# Patient Record
Sex: FEMALE | Race: OTHER | NOT HISPANIC OR LATINO | URBAN - METROPOLITAN AREA
[De-identification: names, ages, dates, MRNs, and addresses within clinical notes are randomized per-mention and may not be internally consistent; named-entity substitution may affect disease eponyms.]

---

## 2022-08-25 ENCOUNTER — EMERGENCY (EMERGENCY)
Facility: HOSPITAL | Age: 62
LOS: 1 days | Discharge: HOME | End: 2022-08-25
Admitting: EMERGENCY MEDICINE

## 2022-08-25 VITALS
RESPIRATION RATE: 18 BRPM | SYSTOLIC BLOOD PRESSURE: 119 MMHG | TEMPERATURE: 98 F | DIASTOLIC BLOOD PRESSURE: 71 MMHG | OXYGEN SATURATION: 97 % | HEART RATE: 67 BPM

## 2022-08-25 PROCEDURE — 99285 EMERGENCY DEPT VISIT HI MDM: CPT

## 2022-08-25 NOTE — ED ADULT TRIAGE NOTE - CHIEF COMPLAINT QUOTE
c/o left sided chest pain x 1 hour. states is intermittent. not associated with any other symptoms. denies any medical hx.

## 2022-08-26 VITALS
HEART RATE: 66 BPM | RESPIRATION RATE: 17 BRPM | TEMPERATURE: 98 F | OXYGEN SATURATION: 100 % | SYSTOLIC BLOOD PRESSURE: 119 MMHG | DIASTOLIC BLOOD PRESSURE: 74 MMHG

## 2022-08-26 LAB
ALBUMIN SERPL ELPH-MCNC: 3.7 G/DL — SIGNIFICANT CHANGE UP (ref 3.3–5)
ALP SERPL-CCNC: 90 U/L — SIGNIFICANT CHANGE UP (ref 40–120)
ALT FLD-CCNC: 20 U/L — SIGNIFICANT CHANGE UP (ref 4–33)
ANION GAP SERPL CALC-SCNC: 8 MMOL/L — SIGNIFICANT CHANGE UP (ref 7–14)
AST SERPL-CCNC: 18 U/L — SIGNIFICANT CHANGE UP (ref 4–32)
BASOPHILS # BLD AUTO: 0.05 K/UL — SIGNIFICANT CHANGE UP (ref 0–0.2)
BASOPHILS NFR BLD AUTO: 0.6 % — SIGNIFICANT CHANGE UP (ref 0–2)
BILIRUB SERPL-MCNC: <0.2 MG/DL — SIGNIFICANT CHANGE UP (ref 0.2–1.2)
BUN SERPL-MCNC: 15 MG/DL — SIGNIFICANT CHANGE UP (ref 7–23)
CALCIUM SERPL-MCNC: 8.8 MG/DL — SIGNIFICANT CHANGE UP (ref 8.4–10.5)
CHLORIDE SERPL-SCNC: 105 MMOL/L — SIGNIFICANT CHANGE UP (ref 98–107)
CO2 SERPL-SCNC: 23 MMOL/L — SIGNIFICANT CHANGE UP (ref 22–31)
CREAT SERPL-MCNC: 1.18 MG/DL — SIGNIFICANT CHANGE UP (ref 0.5–1.3)
EGFR: 53 ML/MIN/1.73M2 — LOW
EOSINOPHIL # BLD AUTO: 0.15 K/UL — SIGNIFICANT CHANGE UP (ref 0–0.5)
EOSINOPHIL NFR BLD AUTO: 1.7 % — SIGNIFICANT CHANGE UP (ref 0–6)
GLUCOSE SERPL-MCNC: 96 MG/DL — SIGNIFICANT CHANGE UP (ref 70–99)
HCT VFR BLD CALC: 38 % — SIGNIFICANT CHANGE UP (ref 34.5–45)
HGB BLD-MCNC: 11.8 G/DL — SIGNIFICANT CHANGE UP (ref 11.5–15.5)
IANC: 4.92 K/UL — SIGNIFICANT CHANGE UP (ref 1.8–7.4)
IMM GRANULOCYTES NFR BLD AUTO: 0.2 % — SIGNIFICANT CHANGE UP (ref 0–1.5)
LYMPHOCYTES # BLD AUTO: 2.59 K/UL — SIGNIFICANT CHANGE UP (ref 1–3.3)
LYMPHOCYTES # BLD AUTO: 29.8 % — SIGNIFICANT CHANGE UP (ref 13–44)
MCHC RBC-ENTMCNC: 25.7 PG — LOW (ref 27–34)
MCHC RBC-ENTMCNC: 31.1 GM/DL — LOW (ref 32–36)
MCV RBC AUTO: 82.8 FL — SIGNIFICANT CHANGE UP (ref 80–100)
MONOCYTES # BLD AUTO: 0.96 K/UL — HIGH (ref 0–0.9)
MONOCYTES NFR BLD AUTO: 11 % — SIGNIFICANT CHANGE UP (ref 2–14)
NEUTROPHILS # BLD AUTO: 4.92 K/UL — SIGNIFICANT CHANGE UP (ref 1.8–7.4)
NEUTROPHILS NFR BLD AUTO: 56.7 % — SIGNIFICANT CHANGE UP (ref 43–77)
NRBC # BLD: 0 /100 WBCS — SIGNIFICANT CHANGE UP (ref 0–0)
NRBC # FLD: 0 K/UL — SIGNIFICANT CHANGE UP (ref 0–0)
PLATELET # BLD AUTO: 239 K/UL — SIGNIFICANT CHANGE UP (ref 150–400)
POTASSIUM SERPL-MCNC: 3.9 MMOL/L — SIGNIFICANT CHANGE UP (ref 3.5–5.3)
POTASSIUM SERPL-SCNC: 3.9 MMOL/L — SIGNIFICANT CHANGE UP (ref 3.5–5.3)
PROT SERPL-MCNC: 7.3 G/DL — SIGNIFICANT CHANGE UP (ref 6–8.3)
RBC # BLD: 4.59 M/UL — SIGNIFICANT CHANGE UP (ref 3.8–5.2)
RBC # FLD: 13.7 % — SIGNIFICANT CHANGE UP (ref 10.3–14.5)
SODIUM SERPL-SCNC: 136 MMOL/L — SIGNIFICANT CHANGE UP (ref 135–145)
TROPONIN T, HIGH SENSITIVITY RESULT: <6 NG/L — SIGNIFICANT CHANGE UP
TROPONIN T, HIGH SENSITIVITY RESULT: <6 NG/L — SIGNIFICANT CHANGE UP
WBC # BLD: 8.69 K/UL — SIGNIFICANT CHANGE UP (ref 3.8–10.5)
WBC # FLD AUTO: 8.69 K/UL — SIGNIFICANT CHANGE UP (ref 3.8–10.5)

## 2022-08-26 PROCEDURE — 71046 X-RAY EXAM CHEST 2 VIEWS: CPT | Mod: 26

## 2022-08-26 RX ORDER — FAMOTIDINE 10 MG/ML
20 INJECTION INTRAVENOUS ONCE
Refills: 0 | Status: COMPLETED | OUTPATIENT
Start: 2022-08-26 | End: 2022-08-26

## 2022-08-26 RX ADMIN — FAMOTIDINE 20 MILLIGRAM(S): 10 INJECTION INTRAVENOUS at 00:46

## 2022-08-26 RX ADMIN — Medication 30 MILLILITER(S): at 00:45

## 2022-08-26 NOTE — ED PROVIDER NOTE - NSFOLLOWUPINSTRUCTIONS_ED_ALL_ED_FT
Follow up with your primary doctor and Cardiologist. Bring this packet which includes copies of your results.  Advance activity as tolerated.  Continue all previously prescribed medications as directed.  Follow up with your primary care physician in 48-72 hours- bring copies of your results.  Return to the ER for worsening or persistent symptoms, and/or ANY NEW OR CONCERNING SYMPTOMS. THIS INCLUDES BUT IS NOT LIMITED TO INCREASING OR PERSISTENT CHEST PAIN, SHORTNESS OF BREATH OR FOR ANY OTHER SYMPTOMS THAT CONCERN YOU. If you have issues obtaining follow up, please call: [phone] to obtain a doctor or specialist who takes your insurance in your area.  You may call [phone] to make an appointment with the internal medicine clinic.

## 2022-08-26 NOTE — ED PROVIDER NOTE - PROGRESS NOTE DETAILS
TULIO Aviles: Received signout on pt, states she would like to go home, Trop is <6 x2, offered the patient CDU placement for a stress test, continued telemetry monitoring and further eval. Pt declines, states she would like to go home now, states she already has a Cardiologist who she can follow up with.

## 2022-08-26 NOTE — ED PROVIDER NOTE - OBJECTIVE STATEMENT
60 y/o F h/o HLD, GERD p/w L sided chest pain x tonight starting at 8 pm. Pt reports while at rest started to develop sharp chest pain. Pain radiates into neck. Pt unsure if pain is similar to GERD symptoms. Tried taking Pepto bismol with no relief. Pt denies any sob. No exertional symptoms. No fever, chills, cough, abd pain, n/v/d, headache, dizziness.  Pt reports she had a stress test about a year ago that she reports was unremarkable.

## 2022-08-26 NOTE — ED PROVIDER NOTE - CLINICAL SUMMARY MEDICAL DECISION MAKING FREE TEXT BOX
60 y/o F h/o HLD, GERD p/w L sided chest pain x tonight starting at 8 pm.  plan  - labs  - trop  - ekg  - cxr  - GI cocktail  - cdu vs prompt cardiology follow up if neg w/u in ED.

## 2022-08-26 NOTE — ED ADULT NURSE NOTE - NS ED NURSE LEVEL OF CONSCIOUSNESS SPEECH
SUBJECTIVE:  Joann Helton [facility] is a 23 year old female with a chief complaint of sore throat , nasal congestion and ears hurt some.  Felt nauseated with HA yesterday but admits that had a hangover and much better today.  Taking in fluids well.  No fevers that aware of.  Cough normal and no SOB or chest pain.  Onset of symptoms was 2 day(s) ago.    Hx of ear issue and strep when younger  Course of illness: gradual onset and still present.  Severity moderate  Current and Associated symptoms: stuffy nose and sore throat  Treatment measures tried include OTC med and fluids.  Predisposing factors include as above.      No PMH for sinus issues    Still with tonsils.    No past medical history on file.  No current outpatient prescriptions on file.     Social History   Substance Use Topics     Smoking status: Never Smoker     Smokeless tobacco: Never Used     Alcohol use Not on file       ROS:  Review of systems negative except as stated above.    OBJECTIVE:   /80 (BP Location: Right arm, Patient Position: Chair, Cuff Size: Adult Large)  Pulse 86  Temp 99.6  F (37.6  C) (Tympanic)  Wt 195 lb 12.8 oz (88.8 kg)  SpO2 97%  GENERAL APPEARANCE: healthy, alert and no distress  EYES: EOMI,  PERRL, conjunctiva clear  HENT: Left TM erythematous and bulging.  Right TM clear. Oral mucosa moist without erythema or exudate and uvula midline.  No abscess noted  NECK: supple, non-tender to palpation, bilateral cervical adenopathy  RESP: lungs clear to auscultation - no rales, rhonchi or wheezes  CV: regular rates and rhythm, normal S1 S2, no murmur noted  SKIN: no suspicious lesions or rashes    Rapid Strep test is positive    ASSESSMENT:  Strep  OM  - left     PLAN:   Amoxicillin as directed and change toothbrush in 48 hours.     Symptomatic treat with gargles, lozenges, and OTC analgesic as needed. Follow-up with primary clinic if not improving.  Advisement given that patient will be contagious for the next 24-48 hours after  antibiotics initiated     Speaking Coherently

## 2022-08-26 NOTE — ED PROVIDER NOTE - PATIENT PORTAL LINK FT
You can access the FollowMyHealth Patient Portal offered by [facility] by registering at the following website: [email or web address]. By joining [employer]’s FollowMyHealth portal, you will also be able to view your health information using other applications (apps) compatible with our system.

## 2022-08-26 NOTE — ED ADULT NURSE NOTE - OBJECTIVE STATEMENT
received pt to intake 2 aox4 in no apparent distress VSS c/o chest pain intermittent x1 hr. Pt denies SOB, weakness, or any other associated symptoms. 20 G PIV R AC placed labs sent, medicated as per orders. Breaths equal and unlabored will continue to monitor

## 2022-11-23 PROBLEM — K21.9 GASTRO-ESOPHAGEAL REFLUX DISEASE WITHOUT ESOPHAGITIS: Chronic | Status: ACTIVE | Noted: 2022-08-26

## 2022-12-08 PROBLEM — Z00.00 ENCOUNTER FOR PREVENTIVE HEALTH EXAMINATION: Status: ACTIVE | Noted: 2022-12-08

## 2023-01-05 ENCOUNTER — APPOINTMENT (OUTPATIENT)
Dept: ELECTROPHYSIOLOGY | Facility: CLINIC | Age: 63
End: 2023-01-05

## 2023-12-12 ENCOUNTER — EMERGENCY (EMERGENCY)
Facility: HOSPITAL | Age: 63
LOS: 1 days | Discharge: HOME | End: 2023-12-12
Attending: STUDENT IN AN ORGANIZED HEALTH CARE EDUCATION/TRAINING PROGRAM | Admitting: STUDENT IN AN ORGANIZED HEALTH CARE EDUCATION/TRAINING PROGRAM
Payer: MEDICAID

## 2023-12-12 VITALS
DIASTOLIC BLOOD PRESSURE: 69 MMHG | SYSTOLIC BLOOD PRESSURE: 136 MMHG | TEMPERATURE: 98 F | RESPIRATION RATE: 20 BRPM | HEART RATE: 84 BPM | OXYGEN SATURATION: 97 %

## 2023-12-12 LAB
ALBUMIN SERPL ELPH-MCNC: 3.6 G/DL — SIGNIFICANT CHANGE UP (ref 3.3–5)
ALBUMIN SERPL ELPH-MCNC: 3.6 G/DL — SIGNIFICANT CHANGE UP (ref 3.3–5)
ALP SERPL-CCNC: 77 U/L — SIGNIFICANT CHANGE UP (ref 40–120)
ALP SERPL-CCNC: 77 U/L — SIGNIFICANT CHANGE UP (ref 40–120)
ALT FLD-CCNC: 18 U/L — SIGNIFICANT CHANGE UP (ref 4–33)
ALT FLD-CCNC: 18 U/L — SIGNIFICANT CHANGE UP (ref 4–33)
ANION GAP SERPL CALC-SCNC: 9 MMOL/L — SIGNIFICANT CHANGE UP (ref 7–14)
ANION GAP SERPL CALC-SCNC: 9 MMOL/L — SIGNIFICANT CHANGE UP (ref 7–14)
AST SERPL-CCNC: 54 U/L — HIGH (ref 4–32)
AST SERPL-CCNC: 54 U/L — HIGH (ref 4–32)
BASOPHILS # BLD AUTO: 0.03 K/UL — SIGNIFICANT CHANGE UP (ref 0–0.2)
BASOPHILS # BLD AUTO: 0.03 K/UL — SIGNIFICANT CHANGE UP (ref 0–0.2)
BASOPHILS NFR BLD AUTO: 0.4 % — SIGNIFICANT CHANGE UP (ref 0–2)
BASOPHILS NFR BLD AUTO: 0.4 % — SIGNIFICANT CHANGE UP (ref 0–2)
BILIRUB SERPL-MCNC: 0.3 MG/DL — SIGNIFICANT CHANGE UP (ref 0.2–1.2)
BILIRUB SERPL-MCNC: 0.3 MG/DL — SIGNIFICANT CHANGE UP (ref 0.2–1.2)
BUN SERPL-MCNC: 15 MG/DL — SIGNIFICANT CHANGE UP (ref 7–23)
BUN SERPL-MCNC: 15 MG/DL — SIGNIFICANT CHANGE UP (ref 7–23)
CALCIUM SERPL-MCNC: 8.8 MG/DL — SIGNIFICANT CHANGE UP (ref 8.4–10.5)
CALCIUM SERPL-MCNC: 8.8 MG/DL — SIGNIFICANT CHANGE UP (ref 8.4–10.5)
CHLORIDE SERPL-SCNC: 106 MMOL/L — SIGNIFICANT CHANGE UP (ref 98–107)
CHLORIDE SERPL-SCNC: 106 MMOL/L — SIGNIFICANT CHANGE UP (ref 98–107)
CO2 SERPL-SCNC: 20 MMOL/L — LOW (ref 22–31)
CO2 SERPL-SCNC: 20 MMOL/L — LOW (ref 22–31)
CREAT SERPL-MCNC: 0.94 MG/DL — SIGNIFICANT CHANGE UP (ref 0.5–1.3)
CREAT SERPL-MCNC: 0.94 MG/DL — SIGNIFICANT CHANGE UP (ref 0.5–1.3)
D DIMER BLD IA.RAPID-MCNC: <150 NG/ML DDU — SIGNIFICANT CHANGE UP
D DIMER BLD IA.RAPID-MCNC: <150 NG/ML DDU — SIGNIFICANT CHANGE UP
EGFR: 69 ML/MIN/1.73M2 — SIGNIFICANT CHANGE UP
EGFR: 69 ML/MIN/1.73M2 — SIGNIFICANT CHANGE UP
EOSINOPHIL # BLD AUTO: 0.28 K/UL — SIGNIFICANT CHANGE UP (ref 0–0.5)
EOSINOPHIL # BLD AUTO: 0.28 K/UL — SIGNIFICANT CHANGE UP (ref 0–0.5)
EOSINOPHIL NFR BLD AUTO: 3.5 % — SIGNIFICANT CHANGE UP (ref 0–6)
EOSINOPHIL NFR BLD AUTO: 3.5 % — SIGNIFICANT CHANGE UP (ref 0–6)
FLUAV AG NPH QL: SIGNIFICANT CHANGE UP
FLUAV AG NPH QL: SIGNIFICANT CHANGE UP
FLUBV AG NPH QL: SIGNIFICANT CHANGE UP
FLUBV AG NPH QL: SIGNIFICANT CHANGE UP
GLUCOSE SERPL-MCNC: 90 MG/DL — SIGNIFICANT CHANGE UP (ref 70–99)
GLUCOSE SERPL-MCNC: 90 MG/DL — SIGNIFICANT CHANGE UP (ref 70–99)
HCT VFR BLD CALC: 40.6 % — SIGNIFICANT CHANGE UP (ref 34.5–45)
HCT VFR BLD CALC: 40.6 % — SIGNIFICANT CHANGE UP (ref 34.5–45)
HGB BLD-MCNC: 12.7 G/DL — SIGNIFICANT CHANGE UP (ref 11.5–15.5)
HGB BLD-MCNC: 12.7 G/DL — SIGNIFICANT CHANGE UP (ref 11.5–15.5)
IANC: 4.71 K/UL — SIGNIFICANT CHANGE UP (ref 1.8–7.4)
IANC: 4.71 K/UL — SIGNIFICANT CHANGE UP (ref 1.8–7.4)
IMM GRANULOCYTES NFR BLD AUTO: 0.1 % — SIGNIFICANT CHANGE UP (ref 0–0.9)
IMM GRANULOCYTES NFR BLD AUTO: 0.1 % — SIGNIFICANT CHANGE UP (ref 0–0.9)
LYMPHOCYTES # BLD AUTO: 2.37 K/UL — SIGNIFICANT CHANGE UP (ref 1–3.3)
LYMPHOCYTES # BLD AUTO: 2.37 K/UL — SIGNIFICANT CHANGE UP (ref 1–3.3)
LYMPHOCYTES # BLD AUTO: 29.7 % — SIGNIFICANT CHANGE UP (ref 13–44)
LYMPHOCYTES # BLD AUTO: 29.7 % — SIGNIFICANT CHANGE UP (ref 13–44)
MCHC RBC-ENTMCNC: 26.2 PG — LOW (ref 27–34)
MCHC RBC-ENTMCNC: 26.2 PG — LOW (ref 27–34)
MCHC RBC-ENTMCNC: 31.3 GM/DL — LOW (ref 32–36)
MCHC RBC-ENTMCNC: 31.3 GM/DL — LOW (ref 32–36)
MCV RBC AUTO: 83.7 FL — SIGNIFICANT CHANGE UP (ref 80–100)
MCV RBC AUTO: 83.7 FL — SIGNIFICANT CHANGE UP (ref 80–100)
MONOCYTES # BLD AUTO: 0.59 K/UL — SIGNIFICANT CHANGE UP (ref 0–0.9)
MONOCYTES # BLD AUTO: 0.59 K/UL — SIGNIFICANT CHANGE UP (ref 0–0.9)
MONOCYTES NFR BLD AUTO: 7.4 % — SIGNIFICANT CHANGE UP (ref 2–14)
MONOCYTES NFR BLD AUTO: 7.4 % — SIGNIFICANT CHANGE UP (ref 2–14)
NEUTROPHILS # BLD AUTO: 4.71 K/UL — SIGNIFICANT CHANGE UP (ref 1.8–7.4)
NEUTROPHILS # BLD AUTO: 4.71 K/UL — SIGNIFICANT CHANGE UP (ref 1.8–7.4)
NEUTROPHILS NFR BLD AUTO: 58.9 % — SIGNIFICANT CHANGE UP (ref 43–77)
NEUTROPHILS NFR BLD AUTO: 58.9 % — SIGNIFICANT CHANGE UP (ref 43–77)
NRBC # BLD: 0 /100 WBCS — SIGNIFICANT CHANGE UP (ref 0–0)
NRBC # BLD: 0 /100 WBCS — SIGNIFICANT CHANGE UP (ref 0–0)
NRBC # FLD: 0.03 K/UL — HIGH (ref 0–0)
NRBC # FLD: 0.03 K/UL — HIGH (ref 0–0)
NT-PROBNP SERPL-SCNC: 54 PG/ML — SIGNIFICANT CHANGE UP
NT-PROBNP SERPL-SCNC: 54 PG/ML — SIGNIFICANT CHANGE UP
PLATELET # BLD AUTO: 250 K/UL — SIGNIFICANT CHANGE UP (ref 150–400)
PLATELET # BLD AUTO: 250 K/UL — SIGNIFICANT CHANGE UP (ref 150–400)
POTASSIUM SERPL-MCNC: SIGNIFICANT CHANGE UP MMOL/L (ref 3.5–5.3)
POTASSIUM SERPL-MCNC: SIGNIFICANT CHANGE UP MMOL/L (ref 3.5–5.3)
POTASSIUM SERPL-SCNC: SIGNIFICANT CHANGE UP MMOL/L (ref 3.5–5.3)
POTASSIUM SERPL-SCNC: SIGNIFICANT CHANGE UP MMOL/L (ref 3.5–5.3)
PROT SERPL-MCNC: SIGNIFICANT CHANGE UP G/DL (ref 6–8.3)
PROT SERPL-MCNC: SIGNIFICANT CHANGE UP G/DL (ref 6–8.3)
RBC # BLD: 4.85 M/UL — SIGNIFICANT CHANGE UP (ref 3.8–5.2)
RBC # BLD: 4.85 M/UL — SIGNIFICANT CHANGE UP (ref 3.8–5.2)
RBC # FLD: 13.8 % — SIGNIFICANT CHANGE UP (ref 10.3–14.5)
RBC # FLD: 13.8 % — SIGNIFICANT CHANGE UP (ref 10.3–14.5)
RSV RNA NPH QL NAA+NON-PROBE: SIGNIFICANT CHANGE UP
RSV RNA NPH QL NAA+NON-PROBE: SIGNIFICANT CHANGE UP
SARS-COV-2 RNA SPEC QL NAA+PROBE: SIGNIFICANT CHANGE UP
SARS-COV-2 RNA SPEC QL NAA+PROBE: SIGNIFICANT CHANGE UP
SODIUM SERPL-SCNC: 135 MMOL/L — SIGNIFICANT CHANGE UP (ref 135–145)
SODIUM SERPL-SCNC: 135 MMOL/L — SIGNIFICANT CHANGE UP (ref 135–145)
TROPONIN T, HIGH SENSITIVITY RESULT: <6 NG/L — SIGNIFICANT CHANGE UP
TROPONIN T, HIGH SENSITIVITY RESULT: <6 NG/L — SIGNIFICANT CHANGE UP
WBC # BLD: 7.99 K/UL — SIGNIFICANT CHANGE UP (ref 3.8–10.5)
WBC # BLD: 7.99 K/UL — SIGNIFICANT CHANGE UP (ref 3.8–10.5)
WBC # FLD AUTO: 7.99 K/UL — SIGNIFICANT CHANGE UP (ref 3.8–10.5)
WBC # FLD AUTO: 7.99 K/UL — SIGNIFICANT CHANGE UP (ref 3.8–10.5)

## 2023-12-12 PROCEDURE — 99285 EMERGENCY DEPT VISIT HI MDM: CPT

## 2023-12-12 PROCEDURE — 71045 X-RAY EXAM CHEST 1 VIEW: CPT | Mod: 26

## 2023-12-12 NOTE — ED PROVIDER NOTE - OBJECTIVE STATEMENT
63-year-old female presents with 1 week of cough and chest pain.  Patient states 1 week ago she developed fevers and nonproductive cough.  Since onset fevers have resolved, however she has had a persistent dry cough.  States she has been experiencing bouts of chest pain with radiation to bilateral shoulders, left neck, no associated nausea vomiting or diaphoresis.  Additionally she complains of 2 weeks of dyspnea and generalized fatigue, slightly worse on exertion, however, present at rest, denies history of asthma or COPD.  Denies recent travel, family history of blood clotting disorders.  Patient was evaluated by cardiologist about a month ago, received echo, per patient no acute findings.  Denies abdominal pain, dysuria.

## 2023-12-12 NOTE — ED PROVIDER NOTE - ATTENDING CONTRIBUTION TO CARE
I (Rose) agree with above, I performed a history and physical. Counseled april medical staff, physician assistant, and/or medical student on medical decision making as documented. Medical decisions and treatment interventions were made in real time during the patient encounter. Additionally and/or with the following exceptions: 63-year-old female no stated past medical history is presenting to the emergency department with chest pain.  Has had 2 weeks of cough and chest pain.  She has exertional dyspnea that had started even prior to that.  Patient is well-appearing lungs clear to auscultation bilaterally vital signs within normal limits.  Patient is neurologically intact.  Patient was signed out to oncoming attending pending CBC, D-dimer to rule out PE as patient was not PERC negative and CMP and troponin.  EKG nonischemic low voltages.  If workup negative patient would have a heart score less than 4 and would be amenable to outpatient follow-up.

## 2023-12-12 NOTE — ED PROVIDER NOTE - PATIENT PORTAL LINK FT
You can access the FollowMyHealth Patient Portal offered by [facility] by registering at the following website: [email or web address]. By joining [employer] [facility]’s FollowMyHealth portal, you will also be able to view your health information using other applications (apps) compatible with our system. You can access the FollowMyHealth Patient Portal offered by [facility] by registering at the following website: [email or web address]. By joining [employer]’s FollowMyHealth portal, you will also be able to view your health information using other applications (apps) compatible with our system.

## 2023-12-12 NOTE — ED PROVIDER NOTE - CLINICAL SUMMARY MEDICAL DECISION MAKING FREE TEXT BOX
63-year-old female presents with 1 week of cough and chest pain.  Patient states 1 week ago she developed fevers and nonproductive cough.  Since onset fevers have resolved, however she has had a persistent dry cough.  States she has been experiencing bouts of chest pain with radiation to bilateral shoulders, left neck, no associated nausea vomiting or diaphoresis.  Additionally she complains of 2 weeks of dyspnea and generalized fatigue, slightly worse on exertion, however, present at rest, denies history of asthma or COPD.  Denies recent travel, family history of blood clotting disorders.  Patient was evaluated by cardiologist about a month ago, received echo, per patient no acute findings.  Denies abdominal pain, dysuria. Vitals WNL on arrival. Differential includes viral URI, chostochondritis, ACS, pericaditis, myocarditis, PNA, less likely PE, WELLS score of 0.

## 2024-11-30 ENCOUNTER — INPATIENT (INPATIENT)
Facility: HOSPITAL | Age: 64
LOS: 6 days | Discharge: HOME | End: 2024-12-07
Attending: INTERNAL MEDICINE | Admitting: INTERNAL MEDICINE
Payer: MEDICAID

## 2024-11-30 VITALS
TEMPERATURE: 98 F | DIASTOLIC BLOOD PRESSURE: 78 MMHG | HEART RATE: 74 BPM | SYSTOLIC BLOOD PRESSURE: 114 MMHG | OXYGEN SATURATION: 97 % | RESPIRATION RATE: 18 BRPM | WEIGHT: 149.91 LBS

## 2024-11-30 DIAGNOSIS — R42 DIZZINESS AND GIDDINESS: ICD-10-CM

## 2024-11-30 DIAGNOSIS — Z98.891 HISTORY OF UTERINE SCAR FROM PREVIOUS SURGERY: Chronic | ICD-10-CM

## 2024-11-30 LAB
ALBUMIN SERPL ELPH-MCNC: 3.4 G/DL — SIGNIFICANT CHANGE UP (ref 3.3–5)
ALP SERPL-CCNC: 119 U/L — SIGNIFICANT CHANGE UP (ref 40–120)
ALT FLD-CCNC: 33 U/L — SIGNIFICANT CHANGE UP (ref 4–33)
ANION GAP SERPL CALC-SCNC: 12 MMOL/L — SIGNIFICANT CHANGE UP (ref 7–14)
APPEARANCE UR: ABNORMAL
AST SERPL-CCNC: 21 U/L — SIGNIFICANT CHANGE UP (ref 4–32)
BASOPHILS # BLD AUTO: 0.07 K/UL — SIGNIFICANT CHANGE UP (ref 0–0.2)
BASOPHILS NFR BLD AUTO: 0.8 % — SIGNIFICANT CHANGE UP (ref 0–2)
BILIRUB SERPL-MCNC: 0.3 MG/DL — SIGNIFICANT CHANGE UP (ref 0.2–1.2)
BILIRUB UR-MCNC: NEGATIVE — SIGNIFICANT CHANGE UP
BUN SERPL-MCNC: 17 MG/DL — SIGNIFICANT CHANGE UP (ref 7–23)
CALCIUM SERPL-MCNC: 8.8 MG/DL — SIGNIFICANT CHANGE UP (ref 8.4–10.5)
CHLORIDE SERPL-SCNC: 108 MMOL/L — HIGH (ref 98–107)
CO2 SERPL-SCNC: 17 MMOL/L — LOW (ref 22–31)
COLOR SPEC: YELLOW — SIGNIFICANT CHANGE UP
CREAT SERPL-MCNC: 0.86 MG/DL — SIGNIFICANT CHANGE UP (ref 0.5–1.3)
D DIMER BLD IA.RAPID-MCNC: 155 NG/ML DDU — SIGNIFICANT CHANGE UP
DIFF PNL FLD: NEGATIVE — SIGNIFICANT CHANGE UP
EGFR: 77 ML/MIN/1.73M2 — SIGNIFICANT CHANGE UP
EGFR: 77 ML/MIN/1.73M2 — SIGNIFICANT CHANGE UP
EOSINOPHIL # BLD AUTO: 0.12 K/UL — SIGNIFICANT CHANGE UP (ref 0–0.5)
EOSINOPHIL NFR BLD AUTO: 1.3 % — SIGNIFICANT CHANGE UP (ref 0–6)
GLUCOSE SERPL-MCNC: 87 MG/DL — SIGNIFICANT CHANGE UP (ref 70–99)
GLUCOSE UR QL: NEGATIVE MG/DL — SIGNIFICANT CHANGE UP
HCT VFR BLD CALC: 44.9 % — SIGNIFICANT CHANGE UP (ref 34.5–45)
HGB BLD-MCNC: 13.7 G/DL — SIGNIFICANT CHANGE UP (ref 11.5–15.5)
IANC: 5.2 K/UL — SIGNIFICANT CHANGE UP (ref 1.8–7.4)
IMM GRANULOCYTES NFR BLD AUTO: 0.2 % — SIGNIFICANT CHANGE UP (ref 0–0.9)
KETONES UR-MCNC: NEGATIVE MG/DL — SIGNIFICANT CHANGE UP
LEUKOCYTE ESTERASE UR-ACNC: ABNORMAL
LIDOCAIN IGE QN: 60 U/L — SIGNIFICANT CHANGE UP (ref 7–60)
LYMPHOCYTES # BLD AUTO: 2.86 K/UL — SIGNIFICANT CHANGE UP (ref 1–3.3)
LYMPHOCYTES # BLD AUTO: 32 % — SIGNIFICANT CHANGE UP (ref 13–44)
MCHC RBC-ENTMCNC: 26.2 PG — LOW (ref 27–34)
MCHC RBC-ENTMCNC: 30.5 G/DL — LOW (ref 32–36)
MCV RBC AUTO: 86 FL — SIGNIFICANT CHANGE UP (ref 80–100)
MONOCYTES # BLD AUTO: 0.67 K/UL — SIGNIFICANT CHANGE UP (ref 0–0.9)
MONOCYTES NFR BLD AUTO: 7.5 % — SIGNIFICANT CHANGE UP (ref 2–14)
NEUTROPHILS # BLD AUTO: 5.2 K/UL — SIGNIFICANT CHANGE UP (ref 1.8–7.4)
NEUTROPHILS NFR BLD AUTO: 58.2 % — SIGNIFICANT CHANGE UP (ref 43–77)
NITRITE UR-MCNC: NEGATIVE — SIGNIFICANT CHANGE UP
NRBC # BLD AUTO: 0 K/UL — SIGNIFICANT CHANGE UP (ref 0–0)
NRBC # BLD: 0 /100 WBCS — SIGNIFICANT CHANGE UP (ref 0–0)
NRBC # FLD: 0 K/UL — SIGNIFICANT CHANGE UP (ref 0–0)
NRBC BLD-RTO: 0 /100 WBCS — SIGNIFICANT CHANGE UP (ref 0–0)
PH UR: 5.5 — SIGNIFICANT CHANGE UP (ref 5–8)
PLATELET # BLD AUTO: 285 K/UL — SIGNIFICANT CHANGE UP (ref 150–400)
POTASSIUM SERPL-MCNC: 5.2 MMOL/L — SIGNIFICANT CHANGE UP (ref 3.5–5.3)
POTASSIUM SERPL-SCNC: 5.2 MMOL/L — SIGNIFICANT CHANGE UP (ref 3.5–5.3)
PROT SERPL-MCNC: 7.9 G/DL — SIGNIFICANT CHANGE UP (ref 6–8.3)
PROT UR-MCNC: SIGNIFICANT CHANGE UP MG/DL
RBC # BLD: 5.22 M/UL — HIGH (ref 3.8–5.2)
RBC # FLD: 13.9 % — SIGNIFICANT CHANGE UP (ref 10.3–14.5)
SODIUM SERPL-SCNC: 137 MMOL/L — SIGNIFICANT CHANGE UP (ref 135–145)
SP GR SPEC: 1.02 — SIGNIFICANT CHANGE UP (ref 1–1.03)
TROPONIN T, HIGH SENSITIVITY RESULT: <6 NG/L — SIGNIFICANT CHANGE UP
UROBILINOGEN FLD QL: 0.2 MG/DL — SIGNIFICANT CHANGE UP (ref 0.2–1)
WBC # BLD: 8.94 K/UL — SIGNIFICANT CHANGE UP (ref 3.8–10.5)
WBC # FLD AUTO: 8.94 K/UL — SIGNIFICANT CHANGE UP (ref 3.8–10.5)

## 2024-11-30 PROCEDURE — 70498 CT ANGIOGRAPHY NECK: CPT | Mod: 26,MC

## 2024-11-30 PROCEDURE — 99223 1ST HOSP IP/OBS HIGH 75: CPT

## 2024-11-30 PROCEDURE — 71046 X-RAY EXAM CHEST 2 VIEWS: CPT | Mod: 26

## 2024-11-30 PROCEDURE — 70496 CT ANGIOGRAPHY HEAD: CPT | Mod: 26,MC

## 2024-11-30 PROCEDURE — 99497 ADVNCD CARE PLAN 30 MIN: CPT | Mod: 25

## 2024-11-30 PROCEDURE — 99285 EMERGENCY DEPT VISIT HI MDM: CPT

## 2024-11-30 RX ORDER — MECLIZINE HCL 12.5 MG
25 TABLET ORAL ONCE
Refills: 0 | Status: COMPLETED | OUTPATIENT
Start: 2024-11-30 | End: 2024-11-30

## 2024-11-30 RX ORDER — SCOPOLAMINE 1 MG/3D
1 PATCH, EXTENDED RELEASE TRANSDERMAL ONCE
Refills: 0 | Status: DISCONTINUED | OUTPATIENT
Start: 2024-11-30 | End: 2024-12-01

## 2024-11-30 RX ORDER — ONDANSETRON HCL/PF 4 MG/2 ML
4 VIAL (ML) INJECTION ONCE
Refills: 0 | Status: COMPLETED | OUTPATIENT
Start: 2024-11-30 | End: 2024-11-30

## 2024-11-30 RX ORDER — LORAZEPAM 4 MG/ML
2 VIAL (ML) INJECTION ONCE
Refills: 0 | Status: DISCONTINUED | OUTPATIENT
Start: 2024-11-30 | End: 2024-11-30

## 2024-11-30 RX ADMIN — Medication 2 MILLIGRAM(S): at 16:59

## 2024-11-30 RX ADMIN — Medication 4 MILLIGRAM(S): at 18:05

## 2024-11-30 RX ADMIN — Medication 1000 MILLILITER(S): at 18:05

## 2024-11-30 RX ADMIN — Medication 20 MILLIGRAM(S): at 13:58

## 2024-11-30 RX ADMIN — Medication 25 MILLIGRAM(S): at 22:31

## 2024-12-01 DIAGNOSIS — K21.9 GASTRO-ESOPHAGEAL REFLUX DISEASE WITHOUT ESOPHAGITIS: ICD-10-CM

## 2024-12-01 DIAGNOSIS — R53.1 WEAKNESS: ICD-10-CM

## 2024-12-01 DIAGNOSIS — R42 DIZZINESS AND GIDDINESS: ICD-10-CM

## 2024-12-01 DIAGNOSIS — R51.9 HEADACHE, UNSPECIFIED: ICD-10-CM

## 2024-12-01 DIAGNOSIS — Z29.9 ENCOUNTER FOR PROPHYLACTIC MEASURES, UNSPECIFIED: ICD-10-CM

## 2024-12-01 LAB — GLUCOSE BLDC GLUCOMTR-MCNC: 91 MG/DL — SIGNIFICANT CHANGE UP (ref 70–99)

## 2024-12-01 PROCEDURE — 99223 1ST HOSP IP/OBS HIGH 75: CPT | Mod: GC

## 2024-12-01 PROCEDURE — 99232 SBSQ HOSP IP/OBS MODERATE 35: CPT

## 2024-12-01 RX ORDER — ASPIRIN 325 MG
81 TABLET ORAL DAILY
Refills: 0 | Status: DISCONTINUED | OUTPATIENT
Start: 2024-12-01 | End: 2024-12-03

## 2024-12-01 RX ORDER — ENOXAPARIN SODIUM 100 MG/ML
40 INJECTION SUBCUTANEOUS EVERY 24 HOURS
Refills: 0 | Status: DISCONTINUED | OUTPATIENT
Start: 2024-12-01 | End: 2024-12-07

## 2024-12-01 RX ORDER — ACETAMINOPHEN 500 MG/5ML
650 LIQUID (ML) ORAL EVERY 6 HOURS
Refills: 0 | Status: DISCONTINUED | OUTPATIENT
Start: 2024-12-01 | End: 2024-12-07

## 2024-12-01 RX ORDER — ATORVASTATIN CALCIUM 80 MG/1
80 TABLET, FILM COATED ORAL AT BEDTIME
Refills: 0 | Status: DISCONTINUED | OUTPATIENT
Start: 2024-12-01 | End: 2024-12-03

## 2024-12-01 RX ORDER — ONDANSETRON HCL/PF 4 MG/2 ML
4 VIAL (ML) INJECTION EVERY 6 HOURS
Refills: 0 | Status: DISCONTINUED | OUTPATIENT
Start: 2024-12-01 | End: 2024-12-07

## 2024-12-01 RX ORDER — MECLIZINE HCL 12.5 MG
25 TABLET ORAL
Refills: 0 | Status: DISCONTINUED | OUTPATIENT
Start: 2024-12-01 | End: 2024-12-07

## 2024-12-01 RX ADMIN — Medication 81 MILLIGRAM(S): at 11:07

## 2024-12-01 RX ADMIN — ATORVASTATIN CALCIUM 80 MILLIGRAM(S): 80 TABLET, FILM COATED ORAL at 22:10

## 2024-12-01 RX ADMIN — ENOXAPARIN SODIUM 40 MILLIGRAM(S): 100 INJECTION SUBCUTANEOUS at 05:32

## 2024-12-02 LAB
ANION GAP SERPL CALC-SCNC: 11 MMOL/L — SIGNIFICANT CHANGE UP (ref 7–14)
BUN SERPL-MCNC: 16 MG/DL — SIGNIFICANT CHANGE UP (ref 7–23)
CALCIUM SERPL-MCNC: 8.7 MG/DL — SIGNIFICANT CHANGE UP (ref 8.4–10.5)
CHLORIDE SERPL-SCNC: 105 MMOL/L — SIGNIFICANT CHANGE UP (ref 98–107)
CO2 SERPL-SCNC: 22 MMOL/L — SIGNIFICANT CHANGE UP (ref 22–31)
CREAT SERPL-MCNC: 0.91 MG/DL — SIGNIFICANT CHANGE UP (ref 0.5–1.3)
EGFR: 71 ML/MIN/1.73M2 — SIGNIFICANT CHANGE UP
EGFR: 71 ML/MIN/1.73M2 — SIGNIFICANT CHANGE UP
GLUCOSE SERPL-MCNC: 165 MG/DL — HIGH (ref 70–99)
HCT VFR BLD CALC: 41.7 % — SIGNIFICANT CHANGE UP (ref 34.5–45)
HGB BLD-MCNC: 12.5 G/DL — SIGNIFICANT CHANGE UP (ref 11.5–15.5)
MCHC RBC-ENTMCNC: 25.3 PG — LOW (ref 27–34)
MCHC RBC-ENTMCNC: 30 G/DL — LOW (ref 32–36)
MCV RBC AUTO: 84.4 FL — SIGNIFICANT CHANGE UP (ref 80–100)
NRBC # BLD AUTO: 0 K/UL — SIGNIFICANT CHANGE UP (ref 0–0)
NRBC # BLD: 0 /100 WBCS — SIGNIFICANT CHANGE UP (ref 0–0)
NRBC # FLD: 0 K/UL — SIGNIFICANT CHANGE UP (ref 0–0)
NRBC BLD-RTO: 0 /100 WBCS — SIGNIFICANT CHANGE UP (ref 0–0)
PLATELET # BLD AUTO: 261 K/UL — SIGNIFICANT CHANGE UP (ref 150–400)
POTASSIUM SERPL-MCNC: 4 MMOL/L — SIGNIFICANT CHANGE UP (ref 3.5–5.3)
POTASSIUM SERPL-SCNC: 4 MMOL/L — SIGNIFICANT CHANGE UP (ref 3.5–5.3)
RBC # BLD: 4.94 M/UL — SIGNIFICANT CHANGE UP (ref 3.8–5.2)
RBC # FLD: 13.9 % — SIGNIFICANT CHANGE UP (ref 10.3–14.5)
SODIUM SERPL-SCNC: 138 MMOL/L — SIGNIFICANT CHANGE UP (ref 135–145)
TROPONIN T, HIGH SENSITIVITY RESULT: 7 NG/L — SIGNIFICANT CHANGE UP
WBC # BLD: 7.32 K/UL — SIGNIFICANT CHANGE UP (ref 3.8–10.5)
WBC # FLD AUTO: 7.32 K/UL — SIGNIFICANT CHANGE UP (ref 3.8–10.5)

## 2024-12-02 PROCEDURE — 99232 SBSQ HOSP IP/OBS MODERATE 35: CPT

## 2024-12-02 PROCEDURE — 99222 1ST HOSP IP/OBS MODERATE 55: CPT

## 2024-12-02 PROCEDURE — 70450 CT HEAD/BRAIN W/O DYE: CPT | Mod: 26

## 2024-12-02 RX ORDER — INFLUENZA A VIRUS A/IDAHO/07/2018 (H1N1) ANTIGEN (MDCK CELL DERIVED, PROPIOLACTONE INACTIVATED, INFLUENZA A VIRUS A/INDIANA/08/2018 (H3N2) ANTIGEN (MDCK CELL DERIVED, PROPIOLACTONE INACTIVATED), INFLUENZA B VIRUS B/SINGAPORE/INFTT-16-0610/2016 ANTIGEN (MDCK CELL DERIVED, PROPIOLACTONE INACTIVATED), INFLUENZA B VIRUS B/IOWA/06/2017 ANTIGEN (MDCK CELL DERIVED, PROPIOLACTONE INACTIVATED) 15; 15; 15; 15 UG/.5ML; UG/.5ML; UG/.5ML; UG/.5ML
0.5 INJECTION, SUSPENSION INTRAMUSCULAR ONCE
Refills: 0 | Status: DISCONTINUED | OUTPATIENT
Start: 2024-12-02 | End: 2024-12-07

## 2024-12-02 RX ORDER — LORAZEPAM 4 MG/ML
2 VIAL (ML) INJECTION ONCE
Refills: 0 | Status: DISCONTINUED | OUTPATIENT
Start: 2024-12-02 | End: 2024-12-02

## 2024-12-02 RX ADMIN — Medication 2 MILLIGRAM(S): at 20:18

## 2024-12-02 RX ADMIN — ENOXAPARIN SODIUM 40 MILLIGRAM(S): 100 INJECTION SUBCUTANEOUS at 05:07

## 2024-12-02 RX ADMIN — Medication 40 MILLIGRAM(S): at 05:10

## 2024-12-02 RX ADMIN — ATORVASTATIN CALCIUM 80 MILLIGRAM(S): 80 TABLET, FILM COATED ORAL at 21:30

## 2024-12-02 RX ADMIN — Medication 81 MILLIGRAM(S): at 11:36

## 2024-12-03 ENCOUNTER — RESULT REVIEW (OUTPATIENT)
Age: 64
End: 2024-12-03

## 2024-12-03 LAB
A1C WITH ESTIMATED AVERAGE GLUCOSE RESULT: 5.7 % — HIGH (ref 4–5.6)
ANION GAP SERPL CALC-SCNC: 11 MMOL/L — SIGNIFICANT CHANGE UP (ref 7–14)
BUN SERPL-MCNC: 14 MG/DL — SIGNIFICANT CHANGE UP (ref 7–23)
CALCIUM SERPL-MCNC: 8.9 MG/DL — SIGNIFICANT CHANGE UP (ref 8.4–10.5)
CHLORIDE SERPL-SCNC: 104 MMOL/L — SIGNIFICANT CHANGE UP (ref 98–107)
CHOLEST SERPL-MCNC: 154 MG/DL — SIGNIFICANT CHANGE UP
CO2 SERPL-SCNC: 21 MMOL/L — LOW (ref 22–31)
CREAT SERPL-MCNC: 0.81 MG/DL — SIGNIFICANT CHANGE UP (ref 0.5–1.3)
EGFR: 82 ML/MIN/1.73M2 — SIGNIFICANT CHANGE UP
EGFR: 82 ML/MIN/1.73M2 — SIGNIFICANT CHANGE UP
ESTIMATED AVERAGE GLUCOSE: 117 — SIGNIFICANT CHANGE UP
GLUCOSE SERPL-MCNC: 98 MG/DL — SIGNIFICANT CHANGE UP (ref 70–99)
HCT VFR BLD CALC: 40.2 % — SIGNIFICANT CHANGE UP (ref 34.5–45)
HDLC SERPL-MCNC: 28 MG/DL — LOW
HGB BLD-MCNC: 12.7 G/DL — SIGNIFICANT CHANGE UP (ref 11.5–15.5)
LDLC SERPL-MCNC: 107 MG/DL — HIGH
LIPID PNL WITH DIRECT LDL SERPL: 107 MG/DL — HIGH
MAGNESIUM SERPL-MCNC: 2.1 MG/DL — SIGNIFICANT CHANGE UP (ref 1.6–2.6)
MCHC RBC-ENTMCNC: 25.9 PG — LOW (ref 27–34)
MCHC RBC-ENTMCNC: 31.6 G/DL — LOW (ref 32–36)
MCV RBC AUTO: 82 FL — SIGNIFICANT CHANGE UP (ref 80–100)
NONHDLC SERPL-MCNC: 126 MG/DL — SIGNIFICANT CHANGE UP
NRBC # BLD AUTO: 0 K/UL — SIGNIFICANT CHANGE UP (ref 0–0)
NRBC # BLD: 0 /100 WBCS — SIGNIFICANT CHANGE UP (ref 0–0)
NRBC # FLD: 0 K/UL — SIGNIFICANT CHANGE UP (ref 0–0)
NRBC BLD-RTO: 0 /100 WBCS — SIGNIFICANT CHANGE UP (ref 0–0)
PHOSPHATE SERPL-MCNC: 4.2 MG/DL — SIGNIFICANT CHANGE UP (ref 2.5–4.5)
PLATELET # BLD AUTO: 271 K/UL — SIGNIFICANT CHANGE UP (ref 150–400)
POTASSIUM SERPL-MCNC: 4.2 MMOL/L — SIGNIFICANT CHANGE UP (ref 3.5–5.3)
POTASSIUM SERPL-SCNC: 4.2 MMOL/L — SIGNIFICANT CHANGE UP (ref 3.5–5.3)
RBC # BLD: 4.9 M/UL — SIGNIFICANT CHANGE UP (ref 3.8–5.2)
RBC # FLD: 13.6 % — SIGNIFICANT CHANGE UP (ref 10.3–14.5)
SODIUM SERPL-SCNC: 136 MMOL/L — SIGNIFICANT CHANGE UP (ref 135–145)
TRIGL SERPL-MCNC: 102 MG/DL — SIGNIFICANT CHANGE UP
TSH SERPL-MCNC: 3.26 UIU/ML — SIGNIFICANT CHANGE UP (ref 0.27–4.2)
WBC # BLD: 6.85 K/UL — SIGNIFICANT CHANGE UP (ref 3.8–10.5)
WBC # FLD AUTO: 6.85 K/UL — SIGNIFICANT CHANGE UP (ref 3.8–10.5)

## 2024-12-03 PROCEDURE — 99232 SBSQ HOSP IP/OBS MODERATE 35: CPT

## 2024-12-03 PROCEDURE — 93306 TTE W/DOPPLER COMPLETE: CPT | Mod: 26

## 2024-12-03 RX ORDER — ATORVASTATIN CALCIUM 80 MG/1
40 TABLET, FILM COATED ORAL AT BEDTIME
Refills: 0 | Status: DISCONTINUED | OUTPATIENT
Start: 2024-12-03 | End: 2024-12-07

## 2024-12-03 RX ORDER — ASPIRIN 325 MG
81 TABLET ORAL DAILY
Refills: 0 | Status: DISCONTINUED | OUTPATIENT
Start: 2024-12-03 | End: 2024-12-07

## 2024-12-03 RX ADMIN — ENOXAPARIN SODIUM 40 MILLIGRAM(S): 100 INJECTION SUBCUTANEOUS at 05:06

## 2024-12-03 RX ADMIN — Medication 650 MILLIGRAM(S): at 20:48

## 2024-12-03 RX ADMIN — Medication 650 MILLIGRAM(S): at 19:48

## 2024-12-03 RX ADMIN — ATORVASTATIN CALCIUM 40 MILLIGRAM(S): 80 TABLET, FILM COATED ORAL at 21:17

## 2024-12-03 RX ADMIN — Medication 40 MILLIGRAM(S): at 05:06

## 2024-12-03 RX ADMIN — Medication 25 MILLIGRAM(S): at 12:04

## 2024-12-03 RX ADMIN — Medication 1 APPLICATION(S): at 12:05

## 2024-12-04 LAB
MRSA PCR RESULT.: SIGNIFICANT CHANGE UP
S AUREUS DNA NOSE QL NAA+PROBE: SIGNIFICANT CHANGE UP

## 2024-12-04 PROCEDURE — 99232 SBSQ HOSP IP/OBS MODERATE 35: CPT

## 2024-12-04 RX ADMIN — Medication 1 APPLICATION(S): at 11:24

## 2024-12-04 RX ADMIN — Medication 650 MILLIGRAM(S): at 22:37

## 2024-12-04 RX ADMIN — ENOXAPARIN SODIUM 40 MILLIGRAM(S): 100 INJECTION SUBCUTANEOUS at 05:38

## 2024-12-04 RX ADMIN — Medication 40 MILLIGRAM(S): at 05:38

## 2024-12-04 RX ADMIN — Medication 650 MILLIGRAM(S): at 22:17

## 2024-12-04 RX ADMIN — Medication 81 MILLIGRAM(S): at 11:23

## 2024-12-04 RX ADMIN — ATORVASTATIN CALCIUM 40 MILLIGRAM(S): 80 TABLET, FILM COATED ORAL at 22:16

## 2024-12-05 PROCEDURE — 99232 SBSQ HOSP IP/OBS MODERATE 35: CPT

## 2024-12-05 RX ADMIN — Medication 40 MILLIGRAM(S): at 06:23

## 2024-12-05 RX ADMIN — Medication 81 MILLIGRAM(S): at 14:22

## 2024-12-05 RX ADMIN — Medication 1 APPLICATION(S): at 11:35

## 2024-12-05 RX ADMIN — ENOXAPARIN SODIUM 40 MILLIGRAM(S): 100 INJECTION SUBCUTANEOUS at 06:23

## 2024-12-05 RX ADMIN — ATORVASTATIN CALCIUM 40 MILLIGRAM(S): 80 TABLET, FILM COATED ORAL at 22:28

## 2024-12-06 ENCOUNTER — TRANSCRIPTION ENCOUNTER (OUTPATIENT)
Age: 64
End: 2024-12-06

## 2024-12-06 PROCEDURE — 99239 HOSP IP/OBS DSCHRG MGMT >30: CPT

## 2024-12-06 RX ORDER — ACETAMINOPHEN 500 MG/5ML
2 LIQUID (ML) ORAL
Qty: 0 | Refills: 0 | DISCHARGE
Start: 2024-12-06

## 2024-12-06 RX ORDER — ASPIRIN 325 MG
1 TABLET ORAL
Qty: 0 | Refills: 0 | DISCHARGE
Start: 2024-12-06

## 2024-12-06 RX ORDER — ATORVASTATIN CALCIUM 80 MG/1
1 TABLET, FILM COATED ORAL
Qty: 0 | Refills: 0 | DISCHARGE
Start: 2024-12-06

## 2024-12-06 RX ORDER — MECLIZINE HCL 12.5 MG
1 TABLET ORAL
Qty: 0 | Refills: 0 | DISCHARGE
Start: 2024-12-06

## 2024-12-06 RX ORDER — ASPIRIN 325 MG
1 TABLET ORAL
Refills: 0 | DISCHARGE

## 2024-12-06 RX ORDER — CALCIUM CARBONATE/VITAMIN D3 500MG-5MCG
0 TABLET ORAL
Qty: 0 | Refills: 0 | DISCHARGE

## 2024-12-06 RX ADMIN — Medication 1 APPLICATION(S): at 13:52

## 2024-12-06 RX ADMIN — Medication 650 MILLIGRAM(S): at 20:06

## 2024-12-06 RX ADMIN — ENOXAPARIN SODIUM 40 MILLIGRAM(S): 100 INJECTION SUBCUTANEOUS at 06:54

## 2024-12-06 RX ADMIN — ATORVASTATIN CALCIUM 40 MILLIGRAM(S): 80 TABLET, FILM COATED ORAL at 22:10

## 2024-12-06 RX ADMIN — Medication 40 MILLIGRAM(S): at 06:54

## 2024-12-06 RX ADMIN — Medication 81 MILLIGRAM(S): at 13:04

## 2024-12-07 VITALS
DIASTOLIC BLOOD PRESSURE: 70 MMHG | OXYGEN SATURATION: 100 % | RESPIRATION RATE: 18 BRPM | TEMPERATURE: 98 F | SYSTOLIC BLOOD PRESSURE: 110 MMHG | HEART RATE: 74 BPM

## 2024-12-07 PROCEDURE — 99232 SBSQ HOSP IP/OBS MODERATE 35: CPT

## 2024-12-07 RX ADMIN — Medication 40 MILLIGRAM(S): at 06:10

## 2024-12-07 RX ADMIN — Medication 1 APPLICATION(S): at 12:51

## 2024-12-07 RX ADMIN — ENOXAPARIN SODIUM 40 MILLIGRAM(S): 100 INJECTION SUBCUTANEOUS at 06:10

## 2024-12-07 RX ADMIN — Medication 81 MILLIGRAM(S): at 12:52

## 2025-02-04 ENCOUNTER — INPATIENT (INPATIENT)
Facility: HOSPITAL | Age: 65
LOS: 2 days | Discharge: HOME | End: 2025-02-07
Attending: INTERNAL MEDICINE | Admitting: INTERNAL MEDICINE
Payer: MEDICAID

## 2025-02-04 VITALS
DIASTOLIC BLOOD PRESSURE: 85 MMHG | RESPIRATION RATE: 18 BRPM | HEART RATE: 78 BPM | HEIGHT: 66 IN | WEIGHT: 149.91 LBS | OXYGEN SATURATION: 98 % | TEMPERATURE: 98 F | SYSTOLIC BLOOD PRESSURE: 138 MMHG

## 2025-02-04 DIAGNOSIS — Z98.891 HISTORY OF UTERINE SCAR FROM PREVIOUS SURGERY: Chronic | ICD-10-CM

## 2025-02-04 DIAGNOSIS — R53.1 WEAKNESS: ICD-10-CM

## 2025-02-04 LAB
ALBUMIN SERPL ELPH-MCNC: 3.7 G/DL — SIGNIFICANT CHANGE UP (ref 3.3–5)
ALP SERPL-CCNC: 118 U/L — SIGNIFICANT CHANGE UP (ref 40–120)
ALT FLD-CCNC: 20 U/L — SIGNIFICANT CHANGE UP (ref 4–33)
ANION GAP SERPL CALC-SCNC: 14 MMOL/L — SIGNIFICANT CHANGE UP (ref 7–14)
APTT BLD: 29.9 SEC — SIGNIFICANT CHANGE UP (ref 24.5–35.6)
AST SERPL-CCNC: 22 U/L — SIGNIFICANT CHANGE UP (ref 4–32)
BASOPHILS # BLD AUTO: 0.04 K/UL — SIGNIFICANT CHANGE UP (ref 0–0.2)
BASOPHILS NFR BLD AUTO: 0.5 % — SIGNIFICANT CHANGE UP (ref 0–2)
BILIRUB SERPL-MCNC: 0.4 MG/DL — SIGNIFICANT CHANGE UP (ref 0.2–1.2)
BLOOD GAS VENOUS COMPREHENSIVE RESULT: SIGNIFICANT CHANGE UP
BUN SERPL-MCNC: 14 MG/DL — SIGNIFICANT CHANGE UP (ref 7–23)
CALCIUM SERPL-MCNC: 8.9 MG/DL — SIGNIFICANT CHANGE UP (ref 8.4–10.5)
CHLORIDE SERPL-SCNC: 106 MMOL/L — SIGNIFICANT CHANGE UP (ref 98–107)
CO2 SERPL-SCNC: 17 MMOL/L — LOW (ref 22–31)
CREAT SERPL-MCNC: 0.87 MG/DL — SIGNIFICANT CHANGE UP (ref 0.5–1.3)
EGFR: 75 ML/MIN/1.73M2 — SIGNIFICANT CHANGE UP
EOSINOPHIL # BLD AUTO: 0.11 K/UL — SIGNIFICANT CHANGE UP (ref 0–0.5)
EOSINOPHIL NFR BLD AUTO: 1.5 % — SIGNIFICANT CHANGE UP (ref 0–6)
GLUCOSE BLDC GLUCOMTR-MCNC: 86 MG/DL — SIGNIFICANT CHANGE UP (ref 70–99)
GLUCOSE SERPL-MCNC: 95 MG/DL — SIGNIFICANT CHANGE UP (ref 70–99)
HCT VFR BLD CALC: 40.7 % — SIGNIFICANT CHANGE UP (ref 34.5–45)
HGB BLD-MCNC: 12.8 G/DL — SIGNIFICANT CHANGE UP (ref 11.5–15.5)
IANC: 4.52 K/UL — SIGNIFICANT CHANGE UP (ref 1.8–7.4)
IMM GRANULOCYTES NFR BLD AUTO: 0.3 % — SIGNIFICANT CHANGE UP (ref 0–0.9)
INR BLD: 0.93 RATIO — SIGNIFICANT CHANGE UP (ref 0.85–1.16)
LYMPHOCYTES # BLD AUTO: 2.2 K/UL — SIGNIFICANT CHANGE UP (ref 1–3.3)
LYMPHOCYTES # BLD AUTO: 29.6 % — SIGNIFICANT CHANGE UP (ref 13–44)
MCHC RBC-ENTMCNC: 25.9 PG — LOW (ref 27–34)
MCHC RBC-ENTMCNC: 31.4 G/DL — LOW (ref 32–36)
MCV RBC AUTO: 82.2 FL — SIGNIFICANT CHANGE UP (ref 80–100)
MONOCYTES # BLD AUTO: 0.54 K/UL — SIGNIFICANT CHANGE UP (ref 0–0.9)
MONOCYTES NFR BLD AUTO: 7.3 % — SIGNIFICANT CHANGE UP (ref 2–14)
NEUTROPHILS # BLD AUTO: 4.52 K/UL — SIGNIFICANT CHANGE UP (ref 1.8–7.4)
NEUTROPHILS NFR BLD AUTO: 60.8 % — SIGNIFICANT CHANGE UP (ref 43–77)
NRBC # BLD AUTO: 0 K/UL — SIGNIFICANT CHANGE UP (ref 0–0)
NRBC # BLD: 0 /100 WBCS — SIGNIFICANT CHANGE UP (ref 0–0)
NRBC # FLD: 0 K/UL — SIGNIFICANT CHANGE UP (ref 0–0)
NRBC BLD-RTO: 0 /100 WBCS — SIGNIFICANT CHANGE UP (ref 0–0)
PLATELET # BLD AUTO: 199 K/UL — SIGNIFICANT CHANGE UP (ref 150–400)
POTASSIUM SERPL-MCNC: 4.6 MMOL/L — SIGNIFICANT CHANGE UP (ref 3.5–5.3)
POTASSIUM SERPL-SCNC: 4.6 MMOL/L — SIGNIFICANT CHANGE UP (ref 3.5–5.3)
PROT SERPL-MCNC: 7.8 G/DL — SIGNIFICANT CHANGE UP (ref 6–8.3)
PROTHROM AB SERPL-ACNC: 10.8 SEC — SIGNIFICANT CHANGE UP (ref 9.9–13.4)
RBC # BLD: 4.95 M/UL — SIGNIFICANT CHANGE UP (ref 3.8–5.2)
RBC # FLD: 14.2 % — SIGNIFICANT CHANGE UP (ref 10.3–14.5)
SODIUM SERPL-SCNC: 137 MMOL/L — SIGNIFICANT CHANGE UP (ref 135–145)
TROPONIN T, HIGH SENSITIVITY RESULT: <6 NG/L — SIGNIFICANT CHANGE UP
WBC # BLD: 7.43 K/UL — SIGNIFICANT CHANGE UP (ref 3.8–10.5)
WBC # FLD AUTO: 7.43 K/UL — SIGNIFICANT CHANGE UP (ref 3.8–10.5)

## 2025-02-04 PROCEDURE — 70498 CT ANGIOGRAPHY NECK: CPT | Mod: 26

## 2025-02-04 PROCEDURE — 70450 CT HEAD/BRAIN W/O DYE: CPT | Mod: 26

## 2025-02-04 PROCEDURE — 70496 CT ANGIOGRAPHY HEAD: CPT | Mod: 26

## 2025-02-04 PROCEDURE — 0042T: CPT

## 2025-02-04 PROCEDURE — 99291 CRITICAL CARE FIRST HOUR: CPT

## 2025-02-04 PROCEDURE — 71045 X-RAY EXAM CHEST 1 VIEW: CPT | Mod: 26

## 2025-02-04 PROCEDURE — 99223 1ST HOSP IP/OBS HIGH 75: CPT

## 2025-02-04 RX ORDER — ASPIRIN 81 MG/1
81 TABLET, COATED ORAL DAILY
Refills: 0 | Status: DISCONTINUED | OUTPATIENT
Start: 2025-02-04 | End: 2025-02-07

## 2025-02-04 RX ORDER — ATORVASTATIN CALCIUM 80 MG/1
80 TABLET, FILM COATED ORAL AT BEDTIME
Refills: 0 | Status: DISCONTINUED | OUTPATIENT
Start: 2025-02-04 | End: 2025-02-07

## 2025-02-04 RX ORDER — ENOXAPARIN SODIUM 100 MG/ML
40 INJECTION SUBCUTANEOUS EVERY 24 HOURS
Refills: 0 | Status: DISCONTINUED | OUTPATIENT
Start: 2025-02-04 | End: 2025-02-07

## 2025-02-04 NOTE — CONSULT NOTE ADULT - SUBJECTIVE AND OBJECTIVE BOX
Neurology - Consult Note    Spectra: [phone] ([facility]), [phone] (J)    HPI: 65 yo female with PMH of GERD, presents with L arm and leg weakness and numbness, and L facial droop and facial numbness, LKW when she went to sleep at 2:30 2/4. She also complains of room spinning dizziness since yesterday, which continued today, worsened on head movement. Patient was here 3 months ago with similar symptoms, with L sided weakness, numbness, L facial droop, with vertigo and headache, but did not want to receive an MRI at the time.       Review of Systems:   CONSTITUTIONAL: No fevers or chills  EYES AND ENT: No visual changes or no throat pain   NECK: No pain or stiffness  RESPIRATORY: No shortness of breath  CARDIOVASCULAR: No chest pain or palpitations  GASTROINTESTINAL: No nausea or vomiting   GENITOURINARY: No dysuria  NEUROLOGICAL: +As stated in HPI above  SKIN: No itching, burning, rashes, or lesions   All other review of systems is negative unless indicated above.    Allergies:  No Known Allergies      PMHx/PSHx/Family Hx: As above, otherwise see below   No pertinent past medical history    GERD (gastroesophageal reflux disease)        Social Hx:  Never smoker; no current use of tobacco, alcohol, or illicit drugs    Medications:  MEDICATIONS  (STANDING):    MEDICATIONS  (PRN):      Vitals:  T(C): 36.8 (02-04-25 @ 12:07), Max: 36.8 (02-04-25 @ 12:07)  HR: 78 (02-04-25 @ 12:07) (78 - 78)  BP: 138/85 (02-04-25 @ 12:07) (138/85 - 138/85)  RR: 18 (02-04-25 @ 12:07) (18 - 18)  SpO2: 98% (02-04-25 @ 12:07) (98% - 98%)    Physical Examination:  General - non-toxic appearing female in no acute distress  Cardiovascular - peripheral pulses palpable, no edema  Respiratory - breathing comfortably with no increased work of breathing    Neurologic Exam:  Mental status - Awake, Alert, Oriented to person, place, and time. Speech fluent, repetition and naming intact. Follows simple and complex commands. Attention/concentration intact    Cranial nerves - PERRLA (4mm -> 3mm b/l), VFF, EOMI, decrease sensation in L face, L facial droop, hearing intact b/l, palate with symmetric elevation, trapezius 5/5 strength b/l, tongue midline on protrusion with full lateral movement    Motor - Normal bulk and tone throughout. No pronator drift.    Strength testing                              Right           Left  Should-Abduct       5                   4  Elbow-Flex             5                   5  Elbow-Ext              5                   5  Wrist-Flex              5                   5  Wrist-Ext               5                   5  Interossei              5                   5                        5                   5    Hip-Flex                 5                   4  Hip-Ext                  5                   5  Knee-Flex              5                   5  Knee-Ext                5                   5  Dorsiflex                5                   5  Plantarflex             5                   5    Sensation - Decreased sensation to light touch in L arm and leg.    Reflexes:                                             Right             Left  Triceps                     2+                2+  Biceps                      2+                2+  Brachioradialis          2+                2+  Patellar                    2+                 2+  Achilles                    2+                 2+  Plantar                   Down            Down    Coordination - Finger to Nose intact b/l. HKS intact bilaterally. No tremors appreciated    Gait and station - Normal casual gait. Romberg (-)    Labs:          CAPILLARY BLOOD GLUCOSE              CSF:                  Radiology:     Neurology - Consult Note    Spectra: [phone] ([facility]), [phone] (J)    HPI: 63 yo female with PMH of GERD, presents with L arm and leg weakness and numbness, and L facial droop and facial numbness, LKW when she went to sleep at 2:30 2/4. She also complains of room spinning dizziness and holocephalic headache since yesterday, which continued today, worsened on head movement. Patient was here 3 months ago with similar symptoms, with L sided weakness, numbness, L facial droop, with vertigo and headache, but did not want to receive an MRI at the time. She was started on aspirin, and never received an MRI outpatient.       Review of Systems:   CONSTITUTIONAL: No fevers or chills  EYES AND ENT: No visual changes or no throat pain   NECK: No pain or stiffness  RESPIRATORY: No shortness of breath  CARDIOVASCULAR: No chest pain or palpitations  GASTROINTESTINAL: No nausea or vomiting   GENITOURINARY: No dysuria  NEUROLOGICAL: +As stated in HPI above  SKIN: No itching, burning, rashes, or lesions   All other review of systems is negative unless indicated above.    Allergies:  No Known Allergies      PMHx/PSHx/Family Hx: As above, otherwise see below   No pertinent past medical history    GERD (gastroesophageal reflux disease)        Social Hx:  Never smoker; no current use of tobacco, alcohol, or illicit drugs    Medications:  MEDICATIONS  (STANDING):    MEDICATIONS  (PRN):      Vitals:  T(C): 36.8 (02-04-25 @ 12:07), Max: 36.8 (02-04-25 @ 12:07)  HR: 78 (02-04-25 @ 12:07) (78 - 78)  BP: 138/85 (02-04-25 @ 12:07) (138/85 - 138/85)  RR: 18 (02-04-25 @ 12:07) (18 - 18)  SpO2: 98% (02-04-25 @ 12:07) (98% - 98%)    Physical Examination:  General - non-toxic appearing female in no acute distress  Cardiovascular - peripheral pulses palpable, no edema  Respiratory - breathing comfortably with no increased work of breathing    Neurologic Exam:  Mental status - Awake, Alert, Oriented to person, place, and time. Speech fluent, repetition and naming intact. Follows simple and complex commands. Attention/concentration intact    Cranial nerves - PERRLA (4mm -> 3mm b/l), VFF, EOMI, decrease sensation in L face, L facial droop, hearing intact b/l, palate with symmetric elevation, trapezius 5/5 strength b/l, tongue midline on protrusion with full lateral movement    Motor - Normal bulk and tone throughout. No pronator drift. Some giveaway weakness throughout, effort limited.    Strength testing                              Right           Left  Should-Abduct       4+                 3  Elbow-Flex             5                   4  Elbow-Ext              5                   4  Wrist-Flex              5                   4  Wrist-Ext               5                   4                        5                   4    Hip-Flex                 5                   3  Knee-Flex              5                   4  Knee-Ext                5                   4  Dorsiflex                5                   4+  Plantarflex             5                   4+    Sensation - Decreased sensation to light touch in L arm and leg.    Reflexes:                                             Right             Left  Triceps                     1+                1+  Biceps                      1+                1+  Brachioradialis          1+                1+  Patellar                    1+                 1+  Achilles                    1+                 1+  Plantar                   Down            Down    Coordination - Finger to Nose intact b/l. HKS intact bilaterally. No tremors appreciated    Gait and station - Unable to assess     Labs:          CAPILLARY BLOOD GLUCOSE              CSF:                  Radiology:    CTH/CTA/CTP  IMPRESSION:    No acute infarction or intracranial hemorrhage. Negative CTP. No large   vessel occlusion. Consider MRI of the brain for further evaluation.

## 2025-02-04 NOTE — ED PROVIDER NOTE - CLINICAL SUMMARY MEDICAL DECISION MAKING FREE TEXT BOX
Lito Martin, PGY3 - This is a 64-year-old female with past medical history of GERD and previous history of possible stroke with left-sided weakness, dizziness and headache.  Patient then had a stroke code stroke.  This was November 2024.  Patient refused MRI due to claustrophobia.  Per neuro at that time she was diagnosed with peripheral vertigo and was discharged with aspirin 81 mg.  Patient still takes that.  Patient states that from December her symptoms all resolved.  Patient woke up with.  Patient woke up with left-sided weakness in terms of upper and lower extremity with facial droop.  Patient woke up with the symptoms at 730 however patient went to sleep at 2:30 AM which is about 10 hours prior to presentation.  Denies any difficulty with speech.  Without any vision changes.  Vitals within normal limits.  Physical exam shows well-appearing female not in acute distress.  Alert and oriented x 4 and moving all extremities and following commands and speaking in full sentences without any dysarthria.  Patient has left-sided facial droop otherwise no cranial nerve deficits.  Left upper arm drift with left lower leg drift also.  This is concerning for acute stroke.  Code stroke was initiated and neuro at that patient's side.  Will do code stroke imaging and most likely patient will be admitted.  Pending labs imaging and reassessment.

## 2025-02-04 NOTE — ED ADULT NURSE NOTE - NSFALLUNIVINTERV_ED_ALL_ED
Bed/Stretcher in lowest position, wheels locked, appropriate side rails in place/Call bell, personal items and telephone in reach/Instruct patient to call for assistance before getting out of bed/chair/stretcher/Non-slip footwear applied when patient is off stretcher/[place] to call system/Physically safe environment - no spills, clutter or unnecessary equipment/Purposeful proactive rounding/Room/bathroom lighting operational, light cord in reach

## 2025-02-04 NOTE — H&P ADULT - PROBLEM SELECTOR PLAN 3
-pt with chronic CAO noted previously on last admission  -reports she feels fatigued and out of breath when walking after a certain distance  -prev TTE negative for heart failure  -will get ambulatory saturation in AM  -monitor on tele  -pt. requesting pulm consult however given stable VS and prev negative TTE, likely can be done outpatient.

## 2025-02-04 NOTE — CONSULT NOTE ADULT - ASSESSMENT
63 yo female with PMH of GERD, presents with L arm and leg weakness and numbness, and L facial droop and facial numbness, LKW when she went to sleep at 2:30 2/4. She also complains of room spinning dizziness since yesterday, which continued today, worsened on head movement. Patient was here 3 months ago with similar symptoms, with L sided weakness, numbness, L facial droop, with vertigo and headache, but did not want to receive an MRI at the time.     mRS: 1  LKN: 2:30 2/4  NIHSS: 4    Not a tenecteplase candidate due to out of window.  Not a mechanical thrombectomy candidate due to no LVO.    Impression: L hemiparesis and sensory loss, L facial droop and numbness, likely due to acute ischemic stroke vs recrudescence. Mechanism likely small vessel.    Recommendations:  [] MRI brain without contrast   [] Implantable loop recorder  [] Lipid panel, HbA1c  [] CBC, CMP, coagulation panel, troponin  [] ASA 81 mg and plavix 75mg QD for 21 days, then continue with aspirin only  [] Atorvastatin 80mg (titrate to LDL < 70)   [] Lovenox SQ and SCDs for DVT prophylaxis   [] NPO unless passes dysphagia screen; swallow eval if fails  [] Keep BP permissive up to 220/120 for 48 hours followed by gradual normotension over 2-3 days   [] Telemonitoring  [] Neurological checks and vital signs per unit protocol  [] BGM goals 140-180  [] PT/OT; S/S evaluation    * Case and plan not final until Attending attestation * 63 yo female with PMH of GERD, presents with L arm and leg weakness and numbness, and L facial droop and facial numbness, LKW when she went to sleep at 2:30 2/4. She also complains of room spinning dizziness and holocephalic headache since yesterday, which continued today, worsened on head movement. Patient was here 3 months ago with similar symptoms, with L sided weakness, numbness, L facial droop, with vertigo and headache, but did not want to receive an MRI at the time. She was started on aspirin, and never received an MRI outpatient.      mRS: 1  LKN: 2:30 2/4  NIHSS: 4    Not a tenecteplase candidate due to out of window.  Not a mechanical thrombectomy candidate due to no LVO.    Impression: L hemiparesis and sensory loss, L facial droop and numbness, likely due to acute ischemic stroke vs recrudescence vs migraine. Mechanism likely small vessel.    Recommendations:  [] MRI brain without contrast   [] Lipid panel, HbA1c  [] CBC, CMP, coagulation panel, troponin  [] ASA 81 mg and plavix 75mg QD for 21 days, then continue with aspirin only  [] Atorvastatin 80mg (titrate to LDL < 70)   [] Lovenox SQ and SCDs for DVT prophylaxis   [] NPO unless passes dysphagia screen; swallow eval if fails  [] Keep BP permissive up to 220/120 for 48 hours followed by gradual normotension over 2-3 days   [] Telemonitoring  [] Neurological checks and vital signs per unit protocol  [] BGM goals 140-180  [] PT/OT; S/S evaluation    Discussed with stroke fellow Thiago Elizabeth. Case and plan not final until Attending attestation.

## 2025-02-04 NOTE — H&P ADULT - NSHPSOCIALHISTORY_GEN_ALL_CORE
Retired nurse  Denies smoking/alcohol use  Walks independently without assistive device  Lives at home with family.

## 2025-02-04 NOTE — H&P ADULT - NSHPPHYSICALEXAM_GEN_ALL_CORE
VITALS:   T(C): 36.7 (02-04-25 @ 20:00), Max: 36.9 (02-04-25 @ 14:20)  HR: 87 (02-04-25 @ 20:00) (63 - 87)  BP: 129/93 (02-04-25 @ 20:00) (121/95 - 152/76)  RR: 17 (02-04-25 @ 20:00) (16 - 18)  SpO2: 99% (02-04-25 @ 20:00) (98% - 100%)    GENERAL: NAD, lying in bed comfortably  HEAD:  Atraumatic, normocephalic  EYES: EOMI, PERRLA, conjunctiva and sclera clear  ENT: Moist mucous membranes  NECK: Supple, no JVD  HEART: Regular rate and rhythm, no murmurs, rubs, or gallops  LUNGS: Unlabored respirations.  Clear to auscultation bilaterally, no crackles, wheezing, or rhonchi  ABDOMEN: Soft, nontender, nondistended, +BS  EXTREMITIES: 2+ peripheral pulses bilaterally. No clubbing, cyanosis, or edema  NERVOUS SYSTEM:  A&Ox3, weakness of LUE 4/5, RUE 5/5, LLE 3/5 RLE 5/5, sensation decreased on LLE.  SKIN: No rashes or lesions

## 2025-02-04 NOTE — H&P ADULT - HISTORY OF PRESENT ILLNESS
64F with PMH GERD who presents to the ED with L sided facial numbness and LE weakness and numbness. Patient endorses she was in her usual state of health on going to bed early morning of 2/4 and woke up with L sided symptoms this morning. She also endorses dizziness that has since resolved but notes continued headache that she attributes to dehydration due to not eating anything all day. She was admitted 3 months ago for similar symptoms and was started on aspirin and atorvastatin, however she reports she does not actively take any medications at this time. She did not want to receive an MRI on previous admission and was told to f/u outpatient for imaging but never had the MR done.. Similar to previous admission, pt. endorsing chronic CAO requesting pulm consult for fatigue and dyspnea on exertion.    In the ED code stroke was called and neurology was consulted. CTA of head and neck was negative for LVO and intracranial hemorrhage. CXR clear, labs otherwise unremarkable. TTE in Dec 2024 showing 68% EF

## 2025-02-04 NOTE — H&P ADULT - NSHPREVIEWOFSYSTEMS_GEN_ALL_CORE
REVIEW OF SYSTEMS:    CONSTITUTIONAL: No weakness, fevers or chills, +mild headache  EYES/ENT: No visual changes;  No vertigo or throat pain   NECK: No pain or stiffness  RESPIRATORY: No cough, wheezing, hemoptysis; No shortness of breath  CARDIOVASCULAR: No chest pain or palpitations  GASTROINTESTINAL: No abdominal or epigastric pain. No nausea, vomiting, or hematemesis; No diarrhea or constipation. No melena or hematochezia.  GENITOURINARY: No dysuria, frequency or hematuria  NEUROLOGICAL: +L sided weakness of UE/LE, mild numbness of LLE.  SKIN: No itching, rashes

## 2025-02-04 NOTE — H&P ADULT - NSHPLABSRESULTS_GEN_ALL_CORE
LABS:                          12.8   7.43  )-----------( 199      ( 04 Feb 2025 12:22 )             40.7     02-04    137  |  106  |  14  ----------------------------<  95  4.6   |  17[L]  |  0.87    Ca    8.9      04 Feb 2025 12:22    TPro  7.8  /  Alb  3.7  /  TBili  0.4  /  DBili  x   /  AST  22  /  ALT  20  /  AlkPhos  118  02-04    PT/INR - ( 04 Feb 2025 12:22 )   PT: 10.8 sec;   INR: 0.93 ratio         PTT - ( 04 Feb 2025 12:22 )  PTT:29.9 sec

## 2025-02-04 NOTE — ED ADULT NURSE NOTE - NSFALLRISK_ED_ALL_ED
How Severe Is Your Eczema?: moderate Is This A New Presentation, Or A Follow-Up?: Rash Additional History: Patients primary care physician is Dr. Navin Dewey. No

## 2025-02-04 NOTE — PATIENT PROFILE ADULT - FUNCTIONAL ASSESSMENT - BASIC MOBILITY 6.
4-calculated by average/Not able to assess (calculate score using [facility] averaging method)

## 2025-02-04 NOTE — H&P ADULT - PROBLEM SELECTOR PLAN 1
-pt presenting with 1 day L sided weakness of LUE/LLE with numbness of LLE and face  -code stroke called in ED, CTA imaging negative for acute bleed or ischemia  -had similar symptoms 3 months ago but refused MR and did not continue medications or imaging outpatient  -neuro consulted, recommending MR if amenable, aspirin and plavix, atorvastatin, q4 neurochecks, labs, PT/OT  -f/u neuro recs

## 2025-02-04 NOTE — CONSULT NOTE ADULT - CRITICAL CARE ATTENDING COMMENT
DOS 2/5/25    63 yo female with PMH of GERD, presents with L hemiparesis and vertigo. Stroke code called on ED arrival   Not tnk candidate as OOW and suspected chronicty of symptoms. Seen 3 months ago with similar symptoms and refused inpatient MRI at that time.   CTH and CTA without acute findings  o/e with slightly slower activation of L side of face but fully symmetric with smile and no NLF flattening. Slight LUE and LLE weakness but appears to have functional component as distractible.   Low suspicion for new stroke but will need MRI eventually as outpatient - open MRI - discussed with patient  Refused to participate with PT due to dizziness  Suggest meclizine for her symptoms  No objection to aspirin but low suspicion. Can stop Plavix as symptoms appear chronic.

## 2025-02-04 NOTE — H&P ADULT - PROBLEM SELECTOR PLAN 2
-pt endorsing generalized headache ongoing since onset of symptoms this morning  -pt. attributes to dehydration due to lack of PO intake since this AM   -offered tylenol however patient preferring to see if pain improves with food and PO intake  -CT imaging negative for acute pathology

## 2025-02-04 NOTE — ED PROVIDER NOTE - ATTENDING CONTRIBUTION TO CARE
Attending note:   After face to face evaluation of this patient, I concur with above noted hx, pe, and care plan for this patient. Morrow: 51-year-old male with history of hypertension hyperlipidemia.  Last stress test was 1-1/2 years ago.  Patient presents ED now with intermittent chest pain for 3 days.  Patient has sharp pain in the left side the chest and today it was radiating down left arm.  Patient notes some lightheadedness and dizziness with it but no significant shortness of breath.  Patient denies any nausea or vomiting or diaphoresis.  Patient states pain is not exertional or positional.  Patient was seen at urgent care and sent in for possible abnormal EKG/STEMI.  Patient's vital signs in ED are unremarkable and patient is in no significant distress.  There is no significant tenderness on chest wall palpation.  No JVD is noted.  There is no pitting edema noted.  Pulses are equal and strong in all 4 extremities.  Lungs are clear bilaterally.  Heart is regular without any significant murmur.  Upper extremities show normal movement.  EKG in ED Attending note:   After face to face evaluation of this patient, I concur with above noted hx, pe, and care plan for this patient. Morrow: 64-year-old female with history of GERD and possibly recent CVA patient currently takes baby aspirin.  Patient presents to the ED now with complaints of dizziness that started last night at 10:30 PM.  Patient went to sleep well and at 2:30 AM was fine.  This morning she woke up with left arm numbness and weakness and facial droop and tingling in left side of face.  Patient denies any headache or blurry vision.  Patient is also having some shortness of breath with exertion but no chest pain.  No nausea or vomiting.  Patient blood pressure and heart rate was normal patient is afebrile.  Patient appears uncomfortable.  There is a left facial droop noted with decreased nasolabial fold on left side.  There is some mild past-pointing on the left arm.  There is decreased sensation left face left arm and left leg.  There is a left pronator drift.  Lungs are clear and heart is regular rate and rhythm.  Abdomen is soft nontender.  There is no pitting edema noted.  Stroke code was called from triage and patient was evaluated outside of CT with neurology team.  Patient to get a CT and CT angio.  Awaiting results.

## 2025-02-04 NOTE — PATIENT PROFILE ADULT - FALL HARM RISK - HARM RISK INTERVENTIONS
Communicate Risk of Fall with Harm to all staff/Monitor for mental status changes/Monitor gait and stability/Tailored Fall Risk Interventions/Use of alarms - bed, chair and/or voice tab/Bed in lowest position, wheels locked, appropriate side rails in place/Call bell, personal items and telephone in reach/Instruct patient to call for assistance before getting out of bed or chair/Non-slip footwear when patient is out of bed/[place] to call system/Physically safe environment - no spills, clutter or unnecessary equipment/Purposeful Proactive Rounding/Room/bathroom lighting operational, light cord in reach

## 2025-02-04 NOTE — ED ADULT NURSE NOTE - OBJECTIVE STATEMENT
Patient coming to ED for dizziness starting 10pm last night. Patient also endorses L sided weakness and numbness/ tingling starting 10am. Patient A&OX4. Breathing non-labored. Labs sent. Left 20G IVL in place. Code stroke called. Neurology present. CT scan in progress. FS dn. Patient ambulatory with assist. Plan of care in progress. Patient coming to ED for dizziness starting 10pm last night. Patient also endorses L sided weakness and numbness/ tingling starting 10am. Patient A&OX4. Breathing non-labored. Labs sent. Left 20G IVL in place. Code stroke called. Neurology present. CT scan in progress. FS dn. Patient noted with slight left side weakness and decreases sensation to left side face. Patient ambulatory with assist. Plan of care in progress.

## 2025-02-04 NOTE — H&P ADULT - ASSESSMENT
64F with PMH GERD who presents to the ED with L sided facial numbness and LE weakness and numbness.

## 2025-02-04 NOTE — STROKE CODE NOTE - NIH STROKE SCALE: 3. VISUAL, QM
12/26/2019         RE: Faviola Bolivar  [address]  [place] [address]        Dear Colleague,    Thank you for referring your patient, Faviola Bolivar, to the [facility]. Please see a copy of my visit note below.     Current Eye Medications:  Refresh drops, hot and cold packs.       Subjective:  Patient was seen in the ER on 12-21-19, and diagnosed with Zoster on the right side of her face.  Rash started on the 19th and she had a headache for one week prior to the ER visit.  She was prescribed Valtrex, but she hasn't picked it up (she was told it was $400).  Right eye has been red, and her eyelids swell and close shut.  She has been nauseas.  She has bifocal glasses, but hasn't been wearing them secondary to the rash on her nose.       Objective:  See Ophthalmology Exam.       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      Plan:  Start Acyclovir by mouth 800 mg five times daily.  Continue using artificial tears both eyes up to four times daily as needed.  Return visit in 2 weeks for MD check.     Ismael Grewal M.D.  [phone]           Again, thank you for allowing me to participate in the care of your patient.        Sincerely,        Ismael Grewal MD    
(0) No visual loss

## 2025-02-04 NOTE — ED ADULT TRIAGE NOTE - CHIEF COMPLAINT QUOTE
Pt presents to ED ambulatory from home with c/o dizziness since 10pm last night. Pt also endorses L sided weakness and numbness/tingling. Pt reports room spinning sensation worse with movement. Pt reports hx of GERD. Dr. Morrow called for stroke eval advised to activate at this time.

## 2025-02-05 DIAGNOSIS — Z29.9 ENCOUNTER FOR PROPHYLACTIC MEASURES, UNSPECIFIED: ICD-10-CM

## 2025-02-05 DIAGNOSIS — R06.09 OTHER FORMS OF DYSPNEA: ICD-10-CM

## 2025-02-05 DIAGNOSIS — R51.9 HEADACHE, UNSPECIFIED: ICD-10-CM

## 2025-02-05 DIAGNOSIS — R53.1 WEAKNESS: ICD-10-CM

## 2025-02-05 LAB
A1C WITH ESTIMATED AVERAGE GLUCOSE RESULT: 6 % — HIGH (ref 4–5.6)
ALBUMIN SERPL ELPH-MCNC: 3.5 G/DL — SIGNIFICANT CHANGE UP (ref 3.3–5)
ALP SERPL-CCNC: 110 U/L — SIGNIFICANT CHANGE UP (ref 40–120)
ALT FLD-CCNC: 20 U/L — SIGNIFICANT CHANGE UP (ref 4–33)
ANION GAP SERPL CALC-SCNC: 11 MMOL/L — SIGNIFICANT CHANGE UP (ref 7–14)
AST SERPL-CCNC: 13 U/L — SIGNIFICANT CHANGE UP (ref 4–32)
BASOPHILS # BLD AUTO: 0.07 K/UL — SIGNIFICANT CHANGE UP (ref 0–0.2)
BASOPHILS NFR BLD AUTO: 1 % — SIGNIFICANT CHANGE UP (ref 0–2)
BILIRUB SERPL-MCNC: 0.3 MG/DL — SIGNIFICANT CHANGE UP (ref 0.2–1.2)
BUN SERPL-MCNC: 15 MG/DL — SIGNIFICANT CHANGE UP (ref 7–23)
CALCIUM SERPL-MCNC: 8.7 MG/DL — SIGNIFICANT CHANGE UP (ref 8.4–10.5)
CHLORIDE SERPL-SCNC: 107 MMOL/L — SIGNIFICANT CHANGE UP (ref 98–107)
CHOLEST SERPL-MCNC: 210 MG/DL — HIGH
CO2 SERPL-SCNC: 21 MMOL/L — LOW (ref 22–31)
CREAT SERPL-MCNC: 0.98 MG/DL — SIGNIFICANT CHANGE UP (ref 0.5–1.3)
EGFR: 65 ML/MIN/1.73M2 — SIGNIFICANT CHANGE UP
EOSINOPHIL # BLD AUTO: 0.15 K/UL — SIGNIFICANT CHANGE UP (ref 0–0.5)
EOSINOPHIL NFR BLD AUTO: 2 % — SIGNIFICANT CHANGE UP (ref 0–6)
ESTIMATED AVERAGE GLUCOSE: 126 — SIGNIFICANT CHANGE UP
GLUCOSE SERPL-MCNC: 103 MG/DL — HIGH (ref 70–99)
HCT VFR BLD CALC: 40.2 % — SIGNIFICANT CHANGE UP (ref 34.5–45)
HDLC SERPL-MCNC: 28 MG/DL — LOW
HGB BLD-MCNC: 12.8 G/DL — SIGNIFICANT CHANGE UP (ref 11.5–15.5)
IANC: 3.97 K/UL — SIGNIFICANT CHANGE UP (ref 1.8–7.4)
IMM GRANULOCYTES NFR BLD AUTO: 0.3 % — SIGNIFICANT CHANGE UP (ref 0–0.9)
LIPID PNL WITH DIRECT LDL SERPL: 159 MG/DL — HIGH
LYMPHOCYTES # BLD AUTO: 2.26 K/UL — SIGNIFICANT CHANGE UP (ref 1–3.3)
LYMPHOCYTES # BLD AUTO: 30.9 % — SIGNIFICANT CHANGE UP (ref 13–44)
MAGNESIUM SERPL-MCNC: 2.1 MG/DL — SIGNIFICANT CHANGE UP (ref 1.6–2.6)
MCHC RBC-ENTMCNC: 26.4 PG — LOW (ref 27–34)
MCHC RBC-ENTMCNC: 31.8 G/DL — LOW (ref 32–36)
MCV RBC AUTO: 82.9 FL — SIGNIFICANT CHANGE UP (ref 80–100)
MONOCYTES # BLD AUTO: 0.85 K/UL — SIGNIFICANT CHANGE UP (ref 0–0.9)
MONOCYTES NFR BLD AUTO: 11.6 % — SIGNIFICANT CHANGE UP (ref 2–14)
NEUTROPHILS # BLD AUTO: 3.97 K/UL — SIGNIFICANT CHANGE UP (ref 1.8–7.4)
NEUTROPHILS NFR BLD AUTO: 54.2 % — SIGNIFICANT CHANGE UP (ref 43–77)
NON HDL CHOLESTEROL: 182 MG/DL — HIGH
NRBC # BLD AUTO: 0 K/UL — SIGNIFICANT CHANGE UP (ref 0–0)
NRBC # BLD: 0 /100 WBCS — SIGNIFICANT CHANGE UP (ref 0–0)
NRBC # FLD: 0 K/UL — SIGNIFICANT CHANGE UP (ref 0–0)
NRBC BLD-RTO: 0 /100 WBCS — SIGNIFICANT CHANGE UP (ref 0–0)
PHOSPHATE SERPL-MCNC: 3.3 MG/DL — SIGNIFICANT CHANGE UP (ref 2.5–4.5)
PLATELET # BLD AUTO: 249 K/UL — SIGNIFICANT CHANGE UP (ref 150–400)
POTASSIUM SERPL-MCNC: 3.8 MMOL/L — SIGNIFICANT CHANGE UP (ref 3.5–5.3)
POTASSIUM SERPL-SCNC: 3.8 MMOL/L — SIGNIFICANT CHANGE UP (ref 3.5–5.3)
PROT SERPL-MCNC: 7.1 G/DL — SIGNIFICANT CHANGE UP (ref 6–8.3)
RBC # BLD: 4.85 M/UL — SIGNIFICANT CHANGE UP (ref 3.8–5.2)
RBC # FLD: 14 % — SIGNIFICANT CHANGE UP (ref 10.3–14.5)
SODIUM SERPL-SCNC: 139 MMOL/L — SIGNIFICANT CHANGE UP (ref 135–145)
TRIGL SERPL-MCNC: 125 MG/DL — SIGNIFICANT CHANGE UP
WBC # BLD: 7.32 K/UL — SIGNIFICANT CHANGE UP (ref 3.8–10.5)
WBC # FLD AUTO: 7.32 K/UL — SIGNIFICANT CHANGE UP (ref 3.8–10.5)

## 2025-02-05 PROCEDURE — 70450 CT HEAD/BRAIN W/O DYE: CPT | Mod: 26

## 2025-02-05 PROCEDURE — 99232 SBSQ HOSP IP/OBS MODERATE 35: CPT

## 2025-02-05 RX ORDER — ANTISEPTIC SURGICAL SCRUB 0.04 MG/ML
1 SOLUTION TOPICAL DAILY
Refills: 0 | Status: DISCONTINUED | OUTPATIENT
Start: 2025-02-05 | End: 2025-02-07

## 2025-02-05 RX ORDER — ONDANSETRON 4 MG/1
4 TABLET, ORALLY DISINTEGRATING ORAL EVERY 8 HOURS
Refills: 0 | Status: DISCONTINUED | OUTPATIENT
Start: 2025-02-05 | End: 2025-02-07

## 2025-02-05 RX ORDER — ACETAMINOPHEN 160 MG/5ML
650 SUSPENSION ORAL EVERY 6 HOURS
Refills: 0 | Status: DISCONTINUED | OUTPATIENT
Start: 2025-02-05 | End: 2025-02-07

## 2025-02-05 RX ADMIN — ATORVASTATIN CALCIUM 80 MILLIGRAM(S): 80 TABLET, FILM COATED ORAL at 20:47

## 2025-02-05 RX ADMIN — Medication 25 MILLIGRAM(S): at 11:51

## 2025-02-05 RX ADMIN — ASPIRIN 81 MILLIGRAM(S): 81 TABLET, COATED ORAL at 11:36

## 2025-02-05 RX ADMIN — ENOXAPARIN SODIUM 40 MILLIGRAM(S): 100 INJECTION SUBCUTANEOUS at 11:36

## 2025-02-05 RX ADMIN — Medication 75 MILLIGRAM(S): at 11:36

## 2025-02-05 NOTE — OCCUPATIONAL THERAPY INITIAL EVALUATION ADULT - PERTINENT HX OF CURRENT PROBLEM, REHAB EVAL
Pt is a 64 year old female presents to the ED with left sided facial numbness, LE weakness and numbness. CT imaging negative for acute pathology.

## 2025-02-05 NOTE — PHYSICAL THERAPY INITIAL EVALUATION ADULT - ADDITIONAL COMMENTS
Pt states she lives with her  in a house. 3 steps to enter, bedroom on the first floor. Prior to admission pt reports being independent in ADLs and ambulation without device. Pt reports she was discharged from rehab ~1 month ago.    Following evaluation, pt was left sitting at edge of bed in no distress, all lines in tact, call bell in reach.

## 2025-02-05 NOTE — PHYSICAL THERAPY INITIAL EVALUATION ADULT - PERTINENT HX OF CURRENT PROBLEM, REHAB EVAL
64 year old female presents to the ED with left sided facial numbness, LE weakness and numbness. CT imaging negative for acute pathology.

## 2025-02-05 NOTE — OCCUPATIONAL THERAPY INITIAL EVALUATION ADULT - GENERAL OBSERVATIONS, REHAB EVAL
Patient found semi-reclined in bed, NAD, and able to follow directions. Vitals: O2 sat 96%. Patient agreeable to participate in skilled OT evaluation.

## 2025-02-06 ENCOUNTER — TRANSCRIPTION ENCOUNTER (OUTPATIENT)
Age: 65
End: 2025-02-06

## 2025-02-06 LAB
CK MB BLD-MCNC: 1.6 % — SIGNIFICANT CHANGE UP (ref 0–2.5)
CK MB CFR SERPL CALC: 1.1 NG/ML — SIGNIFICANT CHANGE UP
CK SERPL-CCNC: 70 U/L — SIGNIFICANT CHANGE UP (ref 25–170)
TROPONIN T, HIGH SENSITIVITY RESULT: <6 NG/L — SIGNIFICANT CHANGE UP

## 2025-02-06 PROCEDURE — 93010 ELECTROCARDIOGRAM REPORT: CPT | Mod: 76

## 2025-02-06 PROCEDURE — 99239 HOSP IP/OBS DSCHRG MGMT >30: CPT

## 2025-02-06 PROCEDURE — 93010 ELECTROCARDIOGRAM REPORT: CPT | Mod: 77

## 2025-02-06 RX ORDER — ASPIRIN 81 MG/1
1 TABLET, COATED ORAL
Qty: 30 | Refills: 0
Start: 2025-02-06 | End: 2025-03-07

## 2025-02-06 RX ORDER — ATORVASTATIN CALCIUM 80 MG/1
1 TABLET, FILM COATED ORAL
Qty: 30 | Refills: 0
Start: 2025-02-06 | End: 2025-03-07

## 2025-02-06 RX ORDER — LIDOCAINE HYDROCHLORIDE 30 MG/G
1 CREAM TOPICAL DAILY
Refills: 0 | Status: DISCONTINUED | OUTPATIENT
Start: 2025-02-06 | End: 2025-02-07

## 2025-02-06 RX ADMIN — ANTISEPTIC SURGICAL SCRUB 1 APPLICATION(S): 0.04 SOLUTION TOPICAL at 11:34

## 2025-02-06 RX ADMIN — ASPIRIN 81 MILLIGRAM(S): 81 TABLET, COATED ORAL at 11:34

## 2025-02-06 RX ADMIN — LIDOCAINE HYDROCHLORIDE 1 PATCH: 30 CREAM TOPICAL at 17:11

## 2025-02-06 RX ADMIN — ENOXAPARIN SODIUM 40 MILLIGRAM(S): 100 INJECTION SUBCUTANEOUS at 11:34

## 2025-02-06 RX ADMIN — ATORVASTATIN CALCIUM 80 MILLIGRAM(S): 80 TABLET, FILM COATED ORAL at 21:41

## 2025-02-06 RX ADMIN — ACETAMINOPHEN 650 MILLIGRAM(S): 160 SUSPENSION ORAL at 17:10

## 2025-02-06 NOTE — DISCHARGE NOTE NURSING/CASE MANAGEMENT/SOCIAL WORK - FINANCIAL ASSISTANCE
[facility] provides services at a reduced cost to those who are determined to be eligible through [facility]’s financial assistance program. Information regarding [facility]’s financial assistance program can be found by going to [email or web address] or by calling [phone].

## 2025-02-06 NOTE — PHARMACOTHERAPY INTERVENTION NOTE - COMMENTS
Discharge medications were reviewed with the patient. Current medication schedule was discussed in detail including: medication name, indication, dose, administration times, side effects, and special instructions. All questions and concerns were answered and addressed. Patient verbalized understanding and was provided with educational handout.    Leti Jeronimo, PharmD, [facility]  Clinical Pharmacy Specialist  [place] [phone]

## 2025-02-06 NOTE — DISCHARGE NOTE PROVIDER - CARE PROVIDERS DIRECT ADDRESSES
,DirectAddress_Unknown ,DirectAddress_Unknown,DirectAddress_Unknown ,DirectAddress_Unknown,DirectAddress_Unknown,[email or web address]

## 2025-02-06 NOTE — DISCHARGE NOTE PROVIDER - NSDCMRMEDTOKEN_GEN_ALL_CORE_FT
Outpatient Physical Therapy: Please have outpatient physical therapy services as directed.   aspirin 81 mg oral tablet, chewable: 1 tab(s) orally once a day  atorvastatin 80 mg oral tablet: 1 tab(s) orally once a day (at bedtime)  meclizine 25 mg oral tablet: 1 tab(s) orally every 6 hours as needed for Dizziness  Outpatient Physical Therapy: Please have outpatient physical therapy services as directed.   aspirin 81 mg oral tablet, chewable: 1 tab(s) orally once a day  atorvastatin 80 mg oral tablet: 1 tab(s) orally once a day (at bedtime)  lidocaine 4% topical film: Apply topically to affected area once a day as needed for topical pain  meclizine 25 mg oral tablet: 1 tab(s) orally every 6 hours as needed for Dizziness  Outpatient Physical Therapy: Please have outpatient physical therapy services as directed.

## 2025-02-06 NOTE — CHART NOTE - NSCHARTNOTEFT_GEN_A_CORE
Patient with complaint of recurrent chest pain radiating to L arm and back with shortness of breath. Endorses dizziness and weakness, both known this admission. Denies lightheadedness, nausea, vomiting, numbness/ tingling.       VITALS:   T(C): 36.5 (02-06-25 @ 16:00), Max: 36.8 (02-06-25 @ 12:00)  HR: 78 (02-06-25 @ 16:00) (68 - 78)  BP: 138/83 (02-06-25 @ 16:00) (105/62 - 138/83)  RR: 18 (02-06-25 @ 16:00) (17 - 18)  SpO2: 100% (02-06-25 @ 16:00) (98% - 100%)    PHYSICAL EXAM:   CONSTITUTIONAL: Well groomed, no apparent distress  EYES: PERRLA and symmetric, EOMI    RESP: No respiratory distress, no use of accessory muscles; CTA b/l, no WRR  CV: RRR, +S1S2, no MRG. Chest wall +point tenderness    GI: Soft, NT, ND, no rebound. +BS x4.      NEURO: Strength 5/5 B/L LE, 4/5 LUE, 5/5 RUE. Speech clear. Symmetric smile.  Tongue protrusion midline.   PSYCH: Appropriate insight/judgment; A+O x 3, mood and affect appropriate, recent/remote memory intact    EKG this AM w/ TWI in anterior leads V1-V3 (present on prior EKG from November 2024). Troponin <6 from this morning's labs.       TTE from 12/3/24 without acute findings.  Will repeat EKG and order lidocaine patch topically PRN.  Will add on lipase and cardiac enzymes to AM labs and order nuclear stress test.  Will continue to monitor.  Case discussed with hospitalist. Patient with complaint of chest pain radiating to L arm and back with shortness of breath. Endorses dizziness and weakness, both known this admission. Had chest pain earlier this morning with workup negative for ACS.  Denies lightheadedness, nausea, vomiting, numbness/ tingling.      VITALS:   T(C): 36.5 (02-06-25 @ 16:00), Max: 36.8 (02-06-25 @ 12:00)  HR: 78 (02-06-25 @ 16:00) (68 - 78)  BP: 138/83 (02-06-25 @ 16:00) (105/62 - 138/83)  RR: 18 (02-06-25 @ 16:00) (17 - 18)  SpO2: 100% (02-06-25 @ 16:00) (98% - 100%)    PHYSICAL EXAM:   CONSTITUTIONAL: Well groomed, no apparent distress  EYES: PERRLA and symmetric, EOMI    RESP: No respiratory distress, no use of accessory muscles; CTA b/l, no WRR  CV: RRR, +S1S2, no MRG. Chest wall +point tenderness    GI: Soft, NT, ND, no rebound. +BS x4.      NEURO: Strength 5/5 B/L LE, 4/5 LUE, 5/5 RUE. Speech clear. Symmetric smile. Tongue protrusion midline.   PSYCH: Appropriate insight/judgment; A+O x 3, mood and affect appropriate, recent/remote memory intact    EKG this AM w/ TWI in anterior leads V1-V3 (present on prior EKG from November 2024). Troponin <6 from this morning's labs. TTE from 12/3/24 without acute findings.      Will repeat EKG and order lidocaine patch topically PRN.  Will add on lipase and cardiac enzymes to AM labs and order nuclear stress test.  Will continue to monitor.  Case discussed with hospitalist. Patient with complaint of chest pain radiating to L arm and back with shortness of breath. Endorses dizziness and weakness, both known this admission. Had chest pain earlier this morning with workup negative for ACS.  Denies lightheadedness, nausea, vomiting, numbness/ tingling.      VITALS:   T(C): 36.5 (02-06-25 @ 16:00), Max: 36.8 (02-06-25 @ 12:00)  HR: 78 (02-06-25 @ 16:00) (68 - 78)  BP: 138/83 (02-06-25 @ 16:00) (105/62 - 138/83)  RR: 18 (02-06-25 @ 16:00) (17 - 18)  SpO2: 100% (02-06-25 @ 16:00) (98% - 100%)    PHYSICAL EXAM:   CONSTITUTIONAL: Well groomed, no apparent distress  EYES: PERRLA and symmetric, EOMI    RESP: No respiratory distress, no use of accessory muscles; CTA b/l, no WRR  CV: RRR, +S1S2, no MRG. Chest wall +point tenderness    GI: Soft, NT, ND, no rebound. +BS x4.      NEURO: Strength 5/5 B/L LE, 4/5 LUE, 5/5 RUE. Speech clear. Symmetric smile. Tongue protrusion midline.   PSYCH: Appropriate insight/judgment; A+O x 3, mood and affect appropriate, recent/remote memory intact    EKG this AM w/ TWI in leads V1-V3 (present on prior EKG from November 2024). Troponin <6 from this morning's labs. TTE from 12/3/24 without acute findings.      Will repeat EKG and order lidocaine patch topically PRN.  Will add on lipase and cardiac enzymes to AM labs and order nuclear stress test.  Will continue to monitor.  Case discussed with hospitalist.

## 2025-02-06 NOTE — DISCHARGE NOTE PROVIDER - NSFOLLOWUPCLINICS_GEN_ALL_ED_FT
Home Program  Pulmonary  [place]   Phone:   Fax:   Follow Up Time: 2 weeks     Cardiology at [facility]  Cardiology  [address] [place] [id number]  Phone: [phone]  Fax:   Follow Up Time: 2 weeks    Home Program  Pulmonary  [place]   Phone:   Fax:   Follow Up Time: 2 weeks

## 2025-02-06 NOTE — DISCHARGE NOTE PROVIDER - CARE PROVIDER_API CALL
Alaina Tian  Family Medicine  [address] [id number]  Phone: [phone]  Fax: ()-  Follow Up Time: 1 week   Alaina Tian  Family Medicine  [address] [place] [address]  Phone: [phone]  Fax: ()-  Follow Up Time: 1 week    B[facility]ano  Neurology  [address]  [place] [address]  Phone: [phone]  Fax: [phone]  Follow Up Time: 1 week   Alaina Tian  Family Medicine  [address] [place] [address]  Phone: [phone]  Fax: ()-  Follow Up Time: 1 week    Belle Castano  Neurology  [address]  [place] [address]  Phone: [phone]  Fax: [phone]  Follow Up Time: 1 week    Augusto Henson  Interventional Cardiology  [address] [place] [address]  Phone: [phone]  Fax: [phone]  Follow Up Time: 1 week

## 2025-02-06 NOTE — DISCHARGE NOTE PROVIDER - NSDCCPCAREPLAN_GEN_ALL_CORE_FT
PRINCIPAL DISCHARGE DIAGNOSIS  Diagnosis: Left-sided weakness  Assessment and Plan of Treatment: - you presented with left sided strength and numbness changes   - this is your second admission   - we did CT scans and monitored your ct head over 24 hours   - since you did not want to do an MRI due to it being a closed space, please be sure to schedule an open MRI and follow up with the stroke team  - please also continue aspirin 81 mg daily and atorvastatin 80 mg daily   - if you have any further dizziness, take meclizine medication as needed   - can use tylenol for headaches as well      SECONDARY DISCHARGE DIAGNOSES  Diagnosis: Dyspnea on exertion  Assessment and Plan of Treatment: - you complained of shortness of breath and chest pain while here  - we checked your heart out with ekg and blood tests  - you are not in a dangerous heart or lung problem   - please see the lung doctors in clinic to see your shortness of breath problem   - please see a heart doctor for further care   - please also continue aspirin 81 mg daily and atorvastatin 80 mg daily     PRINCIPAL DISCHARGE DIAGNOSIS  Diagnosis: Left-sided weakness  Assessment and Plan of Treatment: - you presented with left sided strength and numbness changes   - this is your second admission   - we did CT scans and monitored your ct head over 24 hours   - since you did not want to do an MRI due to it being a closed space, please be sure to schedule an open MRI and follow up with the stroke team  - please also continue aspirin 81 mg daily and atorvastatin 80 mg daily   - if you have any further dizziness, take meclizine medication as needed   - can use tylenol for headaches as well      SECONDARY DISCHARGE DIAGNOSES  Diagnosis: Chest pain  Assessment and Plan of Treatment: - you complained of chest pain while here  - we checked your heart out with ekg and blood tests  - we also did a stress test that was negative   - please see a heart doctor for further care   - please also continue aspirin 81 mg daily and atorvastatin 80 mg daily    Diagnosis: Dyspnea on exertion  Assessment and Plan of Treatment: - you complained of shortness of breath   - you are not in a dangerous heart or lung problem   - please see the lung doctors in clinic to see your shortness of breath problem        PRINCIPAL DISCHARGE DIAGNOSIS  Diagnosis: Left-sided weakness  Assessment and Plan of Treatment: - you presented with left sided strength and numbness changes   - this is your second admission   - we did CT scans and monitored your ct head over 24 hours   - since you did not want to do an MRI due to it being a closed space, please be sure to schedule an open MRI and follow up with the stroke team  - please also continue aspirin 81 mg daily and atorvastatin 80 mg daily   - if you have any further dizziness, take meclizine medication as needed   - can use tylenol for headaches as well      SECONDARY DISCHARGE DIAGNOSES  Diagnosis: Dyspnea on exertion  Assessment and Plan of Treatment: - you complained of shortness of breath   - you are not in a dangerous heart or lung problem   - please see the lung doctors in clinic to see your shortness of breath problem       Diagnosis: Chest pain  Assessment and Plan of Treatment: - you complained of chest pain while here  - we checked your heart out with ekg and blood tests  - we also did a stress test that was negative   - please see a heart doctor for further care (Dr. Henson is recommending a CT chest non cont for further evaluation, can follow up with him as outpatient)   - please also continue aspirin 81 mg daily and atorvastatin 80 mg daily

## 2025-02-06 NOTE — DISCHARGE NOTE PROVIDER - ATTENDING DISCHARGE PHYSICAL EXAMINATION:
Constitutional: NAD, resting comfortably   Head: NC/AT  Eyes: anicteric sclera  Respiratory: clear to ascultation b/l, No wheezing or rhonchi, no retractions   Cardiac: +S1/S2; RRR; no M/R/G  Gastrointestinal: abdomen soft, non-distended, no rebound or guarding; +BSx4  Extremities: no peripheral edema  Musculoskeletal: no joint swelling, tenderness or erythema  Vascular: 2+ radial, DP pulses B/L  Neurologic: AAOx3; moving all extremities   Psychiatric: affect and characteristics of appearance, verbalizations, behaviors are appropriate

## 2025-02-06 NOTE — DISCHARGE NOTE NURSING/CASE MANAGEMENT/SOCIAL WORK - PATIENT PORTAL LINK FT
You can access the FollowMyHealth Patient Portal offered by [facility] by registering at the following website: [email or web address]. By joining [facility]’s FollowMyHealth portal, you will also be able to view your health information using other applications (apps) compatible with our system.

## 2025-02-06 NOTE — DISCHARGE NOTE PROVIDER - HOSPITAL COURSE
64F with PMH GERD who presents to the ED with L sided facial numbness and LE weakness and numbness.     #Unilateral weakness.   -pt presenting with 1 day L sided weakness of LUE/LLE with numbness of LLE and face  -code stroke called in ED, CTA imaging negative for acute bleed or ischemia  -had similar symptoms 3 months ago but refused MR and did not continue medications or imaging outpatient  -neuro consulted, given that the symptoms seem to be chronic, only recommended aspirin 81 mg daily and atorvastatin 80 mg daily   - patient states she cannot tolerate an MRI and would prefer OP follow up for an open MRI   - counseled pt extensively on following up with neurology and completing the MRI   - continue prn meclizine as well for dizziness     #Chest pain  #dyspnea on exertion   -pt with chronic CAO noted previously on last admission  -reports she feels fatigued and out of breath when walking after a certain distance  -  TTE completed in 12/2024: normal LVEF without sig valvular dysfunction that could explain symptoms   - chest xray with clear lungs   - EKG with TWI in precordial leads, stable since prior admission. No new or additional changes and NSR   - trop negative x2 this admission   - patient is not in ACS or exacerbation of an acute pulmonary disorder   - can follow up with both cardiology (to f/u chronic TWI) and pulmonology to workup CAO     #Headache.   -pt endorsing generalized headache ongoing since onset of symptoms this morning  -CT imaging negative for acute pathology  -tylenol PRN as it improves the headache     Medically Stable for discharge with OP follow up with stroke, pulm, and cardiology 64F with PMH GERD who presents to the ED with L sided facial numbness and LE weakness and numbness.     #Unilateral weakness.   -pt presenting with 1 day L sided weakness of LUE/LLE with numbness of LLE and face  -code stroke called in ED, CTA imaging negative for acute bleed or ischemia  -had similar symptoms 3 months ago but refused MR and did not continue medications or imaging outpatient  -neuro consulted, given that the symptoms seem to be chronic, only recommended aspirin 81 mg daily and atorvastatin 80 mg daily   - patient states she cannot tolerate an MRI and would prefer OP follow up for an open MRI   - counseled pt extensively on following up with neurology and completing the MRI   - continue prn meclizine as well for dizziness     #Chest pain  - complains of chest pain, substernal, lasting for 30 minutes, self resolving. Radiates down left arm, associated with nausea.   - Multiple EKGs checked throughout the day, all with stable TWI in V1-V4 as prior. Trop negative multiple times.   - TTE completed in 12/2024: normal LVEF without sig valvular dysfunction that could explain symptoms   - given severity of symptoms, stress test completed   - cardiology consulted, can continue follow up as OP     #dyspnea on exertion   -pt with chronic ACO noted previously on last admission  -reports she feels fatigued and out of breath when walking after a certain distance  -  TTE completed in 12/2024: normal LVEF without sig valvular dysfunction that could explain symptoms   - chest xray with clear lungs   - EKG with TWI in precordial leads, stable since prior admission. No new or additional changes and NSR   - trop negative x2 this admission   - patient is not in ACS or exacerbation of an acute pulmonary disorder   - can follow up with cardiology and pulmonology to workup CAO     #Headache.   -pt endorsing generalized headache ongoing since onset of symptoms this morning  -CT imaging negative for acute pathology  -tylenol PRN as it improves the headache     Medically Stable for discharge with OP follow up with stroke, pulm, and cardiology

## 2025-02-06 NOTE — DISCHARGE NOTE PROVIDER - NSDCFUSCHEDAPPT_GEN_ALL_CORE_FT
Keshia Shulzt  [facility]  [facility] [address] [place] [address]  Scheduled Appointment: 02/11/2025

## 2025-02-06 NOTE — DISCHARGE NOTE NURSING/CASE MANAGEMENT/SOCIAL WORK - NSDCFUADDAPPT_GEN_ALL_CORE_FT
APPTS ARE READY TO BE MADE: [X] YES    Best Family or Patient Contact (if needed):    Additional Information about above appointments (if needed):    1: Pulmonology   2: Cardiology   3: Stroke   4. PCP     Other comments or requests:      Please obtain open MRI

## 2025-02-06 NOTE — DISCHARGE NOTE NURSING/CASE MANAGEMENT/SOCIAL WORK - NSDCPEFALRISK_GEN_ALL_CORE
For information on Fall & Injury Prevention, visit: [email or web address] OR  [email or web address] OR  https://www.cdc.gov/steadi/patient.html

## 2025-02-06 NOTE — DISCHARGE NOTE PROVIDER - NSDCFUADDAPPT_GEN_ALL_CORE_FT
APPTS ARE READY TO BE MADE: [X] YES    Best Family or Patient Contact (if needed):    Additional Information about above appointments (if needed):    1: Pulmonology   2:   3:     Other comments or requests:    APPTS ARE READY TO BE MADE: [X] YES    Best Family or Patient Contact (if needed):    Additional Information about above appointments (if needed):    1: Pulmonology   2: Cardiology   3:     Other comments or requests:    APPTS ARE READY TO BE MADE: [X] YES    Best Family or Patient Contact (if needed):    Additional Information about above appointments (if needed):    1: Pulmonology   2: Cardiology   3: Stroke   4. PCP     Other comments or requests:    APPTS ARE READY TO BE MADE: [X] YES    Best Family or Patient Contact (if needed):    Additional Information about above appointments (if needed):    1: Pulmonology   2: Cardiology   3: Stroke   4. PCP     Other comments or requests:      Please obtain open MRI    APPTS ARE READY TO BE MADE: [X] YES    Best Family or Patient Contact (if needed):    Additional Information about above appointments (if needed):    1: Pulmonology   2: Cardiology   3: Stroke   4. PCP     Other comments or requests:      Please obtain open MRI       Appointment was scheduled in [facility] on 2/11 at 1:30pm (Telehealth) APPTS ARE READY TO BE MADE: [X] YES    Best Family or Patient Contact (if needed):    Additional Information about above appointments (if needed):    1: Pulmonology   2: Cardiology   3: Stroke   4. PCP     Other comments or requests:      Please obtain open MRI     Can follow up with Dr. Augusto Henson (Cardiology) and obtain CT chest outpatient.     Appointment was scheduled in [facility] on 2/11 at 1:30pm (Telehealth)

## 2025-02-07 ENCOUNTER — RESULT REVIEW (OUTPATIENT)
Age: 65
End: 2025-02-07

## 2025-02-07 VITALS
TEMPERATURE: 98 F | HEART RATE: 68 BPM | RESPIRATION RATE: 17 BRPM | DIASTOLIC BLOOD PRESSURE: 67 MMHG | OXYGEN SATURATION: 100 % | SYSTOLIC BLOOD PRESSURE: 133 MMHG

## 2025-02-07 DIAGNOSIS — R07.9 CHEST PAIN, UNSPECIFIED: ICD-10-CM

## 2025-02-07 LAB
ANION GAP SERPL CALC-SCNC: 13 MMOL/L — SIGNIFICANT CHANGE UP (ref 7–14)
BUN SERPL-MCNC: 16 MG/DL — SIGNIFICANT CHANGE UP (ref 7–23)
CALCIUM SERPL-MCNC: 8.9 MG/DL — SIGNIFICANT CHANGE UP (ref 8.4–10.5)
CHLORIDE SERPL-SCNC: 106 MMOL/L — SIGNIFICANT CHANGE UP (ref 98–107)
CK MB BLD-MCNC: 2.7 % — HIGH (ref 0–2.5)
CK MB CFR SERPL CALC: 1.4 NG/ML — SIGNIFICANT CHANGE UP
CK SERPL-CCNC: 51 U/L — SIGNIFICANT CHANGE UP (ref 25–170)
CO2 SERPL-SCNC: 20 MMOL/L — LOW (ref 22–31)
CREAT SERPL-MCNC: 0.83 MG/DL — SIGNIFICANT CHANGE UP (ref 0.5–1.3)
EGFR: 80 ML/MIN/1.73M2 — SIGNIFICANT CHANGE UP
GLUCOSE SERPL-MCNC: 87 MG/DL — SIGNIFICANT CHANGE UP (ref 70–99)
HCT VFR BLD CALC: 43.1 % — SIGNIFICANT CHANGE UP (ref 34.5–45)
HGB BLD-MCNC: 13.4 G/DL — SIGNIFICANT CHANGE UP (ref 11.5–15.5)
LIDOCAIN IGE QN: 63 U/L — HIGH (ref 7–60)
MAGNESIUM SERPL-MCNC: 2 MG/DL — SIGNIFICANT CHANGE UP (ref 1.6–2.6)
MCHC RBC-ENTMCNC: 26 PG — LOW (ref 27–34)
MCHC RBC-ENTMCNC: 31.1 G/DL — LOW (ref 32–36)
MCV RBC AUTO: 83.7 FL — SIGNIFICANT CHANGE UP (ref 80–100)
NRBC # BLD AUTO: 0 K/UL — SIGNIFICANT CHANGE UP (ref 0–0)
NRBC # BLD: 0 /100 WBCS — SIGNIFICANT CHANGE UP (ref 0–0)
NRBC # FLD: 0 K/UL — SIGNIFICANT CHANGE UP (ref 0–0)
NRBC BLD-RTO: 0 /100 WBCS — SIGNIFICANT CHANGE UP (ref 0–0)
PHOSPHATE SERPL-MCNC: 3.1 MG/DL — SIGNIFICANT CHANGE UP (ref 2.5–4.5)
PLATELET # BLD AUTO: 235 K/UL — SIGNIFICANT CHANGE UP (ref 150–400)
POTASSIUM SERPL-MCNC: 4.2 MMOL/L — SIGNIFICANT CHANGE UP (ref 3.5–5.3)
POTASSIUM SERPL-SCNC: 4.2 MMOL/L — SIGNIFICANT CHANGE UP (ref 3.5–5.3)
RBC # BLD: 5.15 M/UL — SIGNIFICANT CHANGE UP (ref 3.8–5.2)
RBC # FLD: 14 % — SIGNIFICANT CHANGE UP (ref 10.3–14.5)
SODIUM SERPL-SCNC: 139 MMOL/L — SIGNIFICANT CHANGE UP (ref 135–145)
TROPONIN T, HIGH SENSITIVITY RESULT: 10 NG/L — SIGNIFICANT CHANGE UP
WBC # BLD: 6.79 K/UL — SIGNIFICANT CHANGE UP (ref 3.8–10.5)
WBC # FLD AUTO: 6.79 K/UL — SIGNIFICANT CHANGE UP (ref 3.8–10.5)

## 2025-02-07 PROCEDURE — 93018 CV STRESS TEST I&R ONLY: CPT | Mod: GC

## 2025-02-07 PROCEDURE — 78451 HT MUSCLE IMAGE SPECT SING: CPT | Mod: 26

## 2025-02-07 PROCEDURE — 99232 SBSQ HOSP IP/OBS MODERATE 35: CPT

## 2025-02-07 PROCEDURE — 93016 CV STRESS TEST SUPVJ ONLY: CPT | Mod: GC

## 2025-02-07 RX ORDER — LIDOCAINE HYDROCHLORIDE 30 MG/G
1 CREAM TOPICAL
Qty: 14 | Refills: 0
Start: 2025-02-07 | End: 2025-03-08

## 2025-02-07 RX ADMIN — ANTISEPTIC SURGICAL SCRUB 1 APPLICATION(S): 0.04 SOLUTION TOPICAL at 14:14

## 2025-02-07 RX ADMIN — ENOXAPARIN SODIUM 40 MILLIGRAM(S): 100 INJECTION SUBCUTANEOUS at 15:37

## 2025-02-07 RX ADMIN — ASPIRIN 81 MILLIGRAM(S): 81 TABLET, COATED ORAL at 14:25

## 2025-02-07 RX ADMIN — LIDOCAINE HYDROCHLORIDE 1 PATCH: 30 CREAM TOPICAL at 14:26

## 2025-02-07 NOTE — PROVIDER CONTACT NOTE (OTHER) - ACTION/TREATMENT ORDERED:
Medication:   Requested Prescriptions     Pending Prescriptions Disp Refills    blood glucose test strips (TRUE METRIX BLOOD GLUCOSE TEST) strip 100 strip 3     Sig: Test bid and prn        Last Filled:      Patient Phone Number: [phone] (home) [phone] (work)    Last appt: 6/15/2023   Next appt: 9/7/2023    Last OARRS:   RX Monitoring 3/8/2021   Attestation -   Periodic Controlled Substance Monitoring Possible medication side effects, risk of tolerance/dependence & alternative treatments discussed. ;No signs of potential drug abuse or diversion identified.
provider notified
Safety maintained

## 2025-02-07 NOTE — PROVIDER CONTACT NOTE (OTHER) - BACKGROUND
65 yo female with a PMH of left sides weakness, dizziness, headache
63 yo female with a PMH of left sides weakness, dizziness, headache

## 2025-02-07 NOTE — PROGRESS NOTE ADULT - TIME BILLING
Time-based billing (NON-critical care).     More than 50% of the visit was spent counseling and / or coordinating care by the attending physician.  The necessity of the time spent during the encounter on this date of service was due to:     review of laboratory data, radiology results, consultants' recommendations, documentation in [place], discussion with patient/ACP and interdisciplinary staff (such as , social workers, etc). Interventions were performed as documented above.
Time-based billing (NON-critical care).     More than 50% of the visit was spent counseling and / or coordinating care by the attending physician.  The necessity of the time spent during the encounter on this date of service was due to:     review of laboratory data, radiology results, consultants' recommendations, documentation in [place], discussion with patient/ACP and interdisciplinary staff (such as , social workers, etc). Interventions were performed as documented above.
Time-based billing (NON-critical care).     More than 50% of the visit was spent counseling and / or coordinating care by the attending physician.  The necessity of the time spent during the encounter on this date of service was due to:     review of laboratory data, radiology results, consultants' recommendations, documentation in [place], discussion with patient/ACP and interdisciplinary staff (such as , social workers, etc). Interventions were performed as documented above.

## 2025-02-07 NOTE — PROGRESS NOTE ADULT - PROBLEM SELECTOR PLAN 4
-pt with chronic CAO noted previously on last admission  -reports she feels fatigued and out of breath when walking after a certain distance  -prev TTE negative for heart failure  -ambulatory saturation negative. Pt did not become hypoxic   -no sig events on tele noted   - OP pulyobani meyer
-pt with chronic CAO noted previously on last admission  -reports she feels fatigued and out of breath when walking after a certain distance  -prev TTE negative for heart failure  -ambulatory saturation negative. Pt did not become hypoxic   -no sig events on tele noted   - OP pulyobani meyer
Diet: regular  DVT: Lovenox  Dispo: Pending repeat CT head assessment. Likely dc tomorrow

## 2025-02-07 NOTE — CONSULT NOTE ADULT - SUBJECTIVE AND OBJECTIVE BOX
Augusto Henson MD  Interventional Cardiology / Advance Heart Failure and Cardiac Transplant Specialist  [place] Office : [phone] [place] [address] [place] [address]  Tel:   [place] Office : [address] [facility] [place] [address]  Tel: [phone]         HISTORY OF PRESENTING ILLNESS:  HPI:  64F with PMH GERD who presents to the ED with L sided facial numbness and LE weakness and numbness. Patient endorses she was in her usual state of health on going to bed early morning of  and woke up with L sided symptoms this morning. She also endorses dizziness that has since resolved but notes continued headache that she attributes to dehydration due to not eating anything all day. She was admitted 3 months ago for similar symptoms and was started on aspirin and atorvastatin, however she reports she does not actively take any medications at this time. She did not want to receive an MRI on previous admission and was told to f/u outpatient for imaging but never had the MR done.. Similar to previous admission, pt. endorsing chronic CAO requesting pulm consult for fatigue and dyspnea on exertion.    In the ED code stroke was called and neurology was consulted. CTA of head and neck was negative for LVO and intracranial hemorrhage. CXR clear, labs otherwise unremarkable. TTE in Dec 2024 showing 68% EF.        PAST MEDICAL & SURGICAL HISTORY:  GERD (gastroesophageal reflux disease)      S/P           SOCIAL HISTORY: Substance Use (street drugs): ( x ) never used  (  ) other:    FAMILY HISTORY:  FH: stroke (Mother)         MEDICATIONS:  aspirin  chewable 81 milliGRAM(s) Oral daily  enoxaparin Injectable 40 milliGRAM(s) SubCutaneous every 24 hours  acetaminophen     Tablet .. 650 milliGRAM(s) Oral every 6 hours PRN  meclizine 25 milliGRAM(s) Oral every 6 hours PRN  ondansetron Injectable 4 milliGRAM(s) IV Push every 8 hours PRN  atorvastatin 80 milliGRAM(s) Oral at bedtime  chlorhexidine 2% Cloths 1 Application(s) Topical daily  influenza   Vaccine 0.5 milliLiter(s) IntraMuscular once  lidocaine   4% Patch 1 Patch Transdermal daily      FAMILY HISTORY:  FH: stroke (Mother)          Allergies    No Known Allergies    Intolerances    	      PHYSICAL EXAM:  T(C): 36.3 (25 @ 08:06), Max: 36.7 (25 @ 04:00)  HR: 60 (25 @ 08:06) (60 - 78)  BP: 140/75 (25 @ 08:06) (105/67 - 140/75)  RR: 18 (25 @ 08:06) (17 - 18)  SpO2: 98% (25 @ 08:06) (98% - 100%)  Wt(kg): --  I&O's Summary      GENERAL: NAD   EYES: EOMI, PERRLA, conjunctiva and sclera clear  ENMT: No tonsillar erythema, exudates, or enlargement; Moist mucous membranes, Good dentition, No lesions  Cardiovascular: Normal S1 S2, No JVD, No murmurs, No edema  Respiratory: Lungs clear to auscultation	  Gastrointestinal:  Soft, Non-tender, + BS	  Extremities: no edema       LABS:	 	    CARDIAC MARKERS:                                  13.4   6.79  )-----------( 235      ( 2025 05:50 )             43.1         139  |  106  |  16  ----------------------------<  87  4.2   |  20[L]  |  0.83    Ca    8.9      2025 05:50  Phos  3.1       Mg     2.00           proBNP:   Lipid Profile:   HgA1c:   TSH:     Consultant(s) Notes Reviewed:  [x ] YES  [ ] NO    Care Discussed with Consultants/Other Providers [ x] YES  [ ] NO    Imaging Personally Reviewed independently:  [x] YES  [ ] NO    All labs, radiologic studies, vitals, orders and medications list reviewed. Patient is seen and examined at bedside. Case discussed with medical team.    ASSESSMENT/PLAN:

## 2025-02-07 NOTE — PROGRESS NOTE ADULT - PROBLEM SELECTOR PLAN 3
-pt endorsing generalized headache ongoing since onset of symptoms this morning  -CT imaging negative for acute pathology  -tylenol PRN as it improves the headache
-pt endorsing generalized headache ongoing since onset of symptoms this morning  -CT imaging negative for acute pathology  -tylenol PRN as it improves the headache
-pt with chronic CAO noted previously on last admission  -reports she feels fatigued and out of breath when walking after a certain distance  -prev TTE negative for heart failure  -will get ambulatory saturation    -monitor on tele  -pt. requesting pulm consult however given stable VS and prev negative TTE, likely can be done outpatient.

## 2025-02-07 NOTE — PROGRESS NOTE ADULT - PROBLEM SELECTOR PLAN 5
Diet: regular  DVT: Lovenox  Dispo: Pending repeat CT head assessment. Likely dc tomorrow
Diet: regular  DVT: Lovenox  Dispo: Pending repeat CT head assessment. Likely dc tomorrow

## 2025-02-07 NOTE — PROVIDER CONTACT NOTE (OTHER) - ASSESSMENT
patient a&o x4, hemodynamically stable, no S&S of distress
A7Ox4. Education provided. Patient stated she never had any seizures an doesn't need siderails padded. Suction was setup. No distress noted.

## 2025-02-07 NOTE — PROGRESS NOTE ADULT - SUBJECTIVE AND OBJECTIVE BOX
Sabiha Skinner MD  [facility] Division of Hospital Medicine  Pager: [phone]  Available via MS Teams    SUBJECTIVE / OVERNIGHT EVENTS:    continues to have dizziness epe with movement   no nausea   has a mild tension type headache       MEDICATIONS  (STANDING):  aspirin  chewable 81 milliGRAM(s) Oral daily  atorvastatin 80 milliGRAM(s) Oral at bedtime  enoxaparin Injectable 40 milliGRAM(s) SubCutaneous every 24 hours  influenza   Vaccine 0.5 milliLiter(s) IntraMuscular once    MEDICATIONS  (PRN):  acetaminophen     Tablet .. 650 milliGRAM(s) Oral every 6 hours PRN Mild Pain (1 - 3)  meclizine 25 milliGRAM(s) Oral every 6 hours PRN Dizziness  ondansetron Injectable 4 milliGRAM(s) IV Push every 8 hours PRN Nausea and/or Vomiting      I&O's Summary      PHYSICAL EXAM:  Vital Signs Last 24 Hrs  T(C): 36.7 (05 Feb 2025 16:00), Max: 36.7 (04 Feb 2025 17:02)  T(F): 98.1 (05 Feb 2025 16:00), Max: 98.1 (04 Feb 2025 17:02)  HR: 75 (05 Feb 2025 16:00) (63 - 87)  BP: 120/73 (05 Feb 2025 16:00) (116/73 - 137/87)  BP(mean): --  RR: 17 (05 Feb 2025 16:00) (16 - 18)  SpO2: 99% (05 Feb 2025 16:00) (97% - 100%)    Parameters below as of 05 Feb 2025 16:00  Patient On (Oxygen Delivery Method): room air      CONSTITUTIONAL: NAD   EYES: conjunctiva and sclera clear  ENMT: Moist oral mucosa   NECK: Supple   RESPIRATORY: Normal respiratory effort; lungs are clear to auscultation bilaterally  CARDIOVASCULAR: Regular rate and rhythm, normal S1 and S2, no murmur/rub/gallop; Peripheral pulses are 2+ bilaterally  ABDOMEN: Nontender to palpation, normoactive bowel sounds, no rebound/guarding   MUSCULOSKELETAL: No lower extremity edema   PSYCH: A+O to person, place, and time; affect appropriate  NEUROLOGY: moves all extremities   SKIN: No rashes    LABS:                        12.8   7.32  )-----------( 249      ( 05 Feb 2025 05:11 )             40.2     02-05    139  |  107  |  15  ----------------------------<  103[H]  3.8   |  21[L]  |  0.98    Ca    8.7      05 Feb 2025 05:11  Phos  3.3     02-05  Mg     2.10     02-05    TPro  7.1  /  Alb  3.5  /  TBili  0.3  /  DBili  x   /  AST  13  /  ALT  20  /  AlkPhos  110  02-05    PT/INR - ( 04 Feb 2025 12:22 )   PT: 10.8 sec;   INR: 0.93 ratio         PTT - ( 04 Feb 2025 12:22 )  PTT:29.9 sec      Urinalysis Basic - ( 05 Feb 2025 05:11 )    Color: x / Appearance: x / SG: x / pH: x  Gluc: 103 mg/dL / Ketone: x  / Bili: x / Urobili: x   Blood: x / Protein: x / Nitrite: x   Leuk Esterase: x / RBC: x / WBC x   Sq Epi: x / Non Sq Epi: x / Bacteria: x            COORDINATION OF CARE:  Communication: discussed plan of care with ACP 
Sabiha Skinner MD  [facility] Division of Hospital Medicine  Pager: [phone]  Available via MS Teams    SUBJECTIVE / OVERNIGHT EVENTS:    continues to have periodic chest pain. Substernal, self limiting after a few minutes, left arm radiation    pending stress test today     MEDICATIONS  (STANDING):  aspirin  chewable 81 milliGRAM(s) Oral daily  atorvastatin 80 milliGRAM(s) Oral at bedtime  chlorhexidine 2% Cloths 1 Application(s) Topical daily  enoxaparin Injectable 40 milliGRAM(s) SubCutaneous every 24 hours  influenza   Vaccine 0.5 milliLiter(s) IntraMuscular once  lidocaine   4% Patch 1 Patch Transdermal daily    MEDICATIONS  (PRN):  acetaminophen     Tablet .. 650 milliGRAM(s) Oral every 6 hours PRN Mild Pain (1 - 3)  meclizine 25 milliGRAM(s) Oral every 6 hours PRN Dizziness  ondansetron Injectable 4 milliGRAM(s) IV Push every 8 hours PRN Nausea and/or Vomiting      I&O's Summary      PHYSICAL EXAM:  Vital Signs Last 24 Hrs  T(C): 36.3 (07 Feb 2025 08:06), Max: 36.7 (07 Feb 2025 04:00)  T(F): 97.4 (07 Feb 2025 08:06), Max: 98.1 (07 Feb 2025 04:00)  HR: 60 (07 Feb 2025 08:06) (60 - 78)  BP: 140/75 (07 Feb 2025 08:06) (105/67 - 140/75)  BP(mean): --  RR: 18 (07 Feb 2025 08:06) (17 - 18)  SpO2: 98% (07 Feb 2025 08:06) (98% - 100%)    Parameters below as of 07 Feb 2025 08:06  Patient On (Oxygen Delivery Method): room air      CONSTITUTIONAL: NAD   EYES: conjunctiva and sclera clear  ENMT: Moist oral mucosa   NECK: Supple   RESPIRATORY: Normal respiratory effort; lungs are clear to auscultation bilaterally  CARDIOVASCULAR: Regular rate and rhythm, normal S1 and S2, no murmur/rub/gallop; Peripheral pulses are 2+ bilaterally  ABDOMEN: Nontender to palpation, normoactive bowel sounds, no rebound/guarding   MUSCULOSKELETAL: No lower extremity edema   PSYCH: A+O to person, place, and time; affect appropriate  NEUROLOGY: moves all extremities   SKIN: No rashes    LABS:                        13.4   6.79  )-----------( 235      ( 07 Feb 2025 05:50 )             43.1     02-07    139  |  106  |  16  ----------------------------<  87  4.2   |  20[L]  |  0.83    Ca    8.9      07 Feb 2025 05:50  Phos  3.1     02-07  Mg     2.00     02-07        CARDIAC MARKERS ( 07 Feb 2025 05:50 )  x     / x     / x     / x     / 1.4 ng/mL  CARDIAC MARKERS ( 06 Feb 2025 11:20 )  x     / x     / x     / x     / 1.1 ng/mL      Urinalysis Basic - ( 07 Feb 2025 05:50 )    Color: x / Appearance: x / SG: x / pH: x  Gluc: 87 mg/dL / Ketone: x  / Bili: x / Urobili: x   Blood: x / Protein: x / Nitrite: x   Leuk Esterase: x / RBC: x / WBC x   Sq Epi: x / Non Sq Epi: x / Bacteria: x            COORDINATION OF CARE:  Communication: discussed plan of care with ACP 
Sabiha Skinner MD  [facility] Division of Hospital Medicine  Pager: [phone]  Available via MS Teams    SUBJECTIVE / OVERNIGHT EVENTS:    complains of chest pain, substernal, lasting for 30 minutes, self resolving. Radiates down left arm, associated with nausea.   Multiple EKGs checked throughout the day, all with stable TWI in V1-V4 as prior. Trop negative multiple times.     MEDICATIONS  (STANDING):  aspirin  chewable 81 milliGRAM(s) Oral daily  atorvastatin 80 milliGRAM(s) Oral at bedtime  chlorhexidine 2% Cloths 1 Application(s) Topical daily  enoxaparin Injectable 40 milliGRAM(s) SubCutaneous every 24 hours  influenza   Vaccine 0.5 milliLiter(s) IntraMuscular once  lidocaine   4% Patch 1 Patch Transdermal daily    MEDICATIONS  (PRN):  acetaminophen     Tablet .. 650 milliGRAM(s) Oral every 6 hours PRN Mild Pain (1 - 3)  meclizine 25 milliGRAM(s) Oral every 6 hours PRN Dizziness  ondansetron Injectable 4 milliGRAM(s) IV Push every 8 hours PRN Nausea and/or Vomiting      I&O's Summary    CONSTITUTIONAL: NAD   EYES: conjunctiva and sclera clear  ENMT: Moist oral mucosa   NECK: Supple   RESPIRATORY: Normal respiratory effort; lungs are clear to auscultation bilaterally  CARDIOVASCULAR: Regular rate and rhythm, normal S1 and S2, no murmur/rub/gallop; Peripheral pulses are 2+ bilaterally  ABDOMEN: Nontender to palpation, normoactive bowel sounds, no rebound/guarding   MUSCULOSKELETAL: No lower extremity edema   PSYCH: A+O to person, place, and time; affect appropriate  NEUROLOGY: moves all extremities   SKIN: No rashes      COORDINATION OF CARE:  Communication: discussed plan of care with ACP

## 2025-02-07 NOTE — PROGRESS NOTE ADULT - PROBLEM SELECTOR PLAN 2
-pt endorsing generalized headache ongoing since onset of symptoms this morning  -CT imaging negative for acute pathology  -tylenol PRN
- complains of chest pain, substernal, lasting for 30 minutes, self resolving. Radiates down left arm, associated with nausea.   - Multiple EKGs checked throughout the day, all with stable TWI in V1-V4 as prior. Trop negative multiple times.   - TTE completed in 12/2024: normal LVEF without sig valvular dysfunction that could explain symptoms   - given severity of symptoms plan to complete stress test and consult cardiology
-pt presenting with 1 day L sided weakness of LUE/LLE with numbness of LLE and face  -code stroke called in ED, CTA imaging negative for acute bleed or ischemia  -had similar symptoms 3 months ago but refused MR and did not continue medications or imaging outpatient  -neuro consulted, given that the symptoms seem to be chronic, only recommended aspirin 81 mg daily and atorvastatin 80 mg daily   - patient states she cannot tolerate an MRI and would prefer OP follow up for an open MRI   - counseled pt extensively on following up with neurology and completing the MRI   - continue prn meclizine as well for dizziness

## 2025-02-07 NOTE — PROGRESS NOTE ADULT - PROBLEM SELECTOR PLAN 1
-pt presenting with 1 day L sided weakness of LUE/LLE with numbness of LLE and face  -code stroke called in ED, CTA imaging negative for acute bleed or ischemia  -had similar symptoms 3 months ago but refused MR and did not continue medications or imaging outpatient  -neuro consulted, recommending aspirin and plavix, atorvastatin 80 mg daily, q4 neurochecks, labs, PT/OT  -patient deferring MRI for now. Discussed with neurology. Plan to repeat CT head tonight, If negative, pt can follow up as OP with stroke to schedule MRI further
- complains of chest pain, substernal, lasting for 30 minutes, self resolving. Radiates down left arm, associated with nausea.   - Multiple EKGs checked throughout the day, all with stable TWI in V1-V4 as prior. Trop negative multiple times.   - TTE completed in 12/2024: normal LVEF without sig valvular dysfunction that could explain symptoms   - given severity of symptoms plan to complete stress test today and consult cardiology   - if stress is negative, can continue follow up with cards as OP
-pt presenting with 1 day L sided weakness of LUE/LLE with numbness of LLE and face  -code stroke called in ED, CTA imaging negative for acute bleed or ischemia  -had similar symptoms 3 months ago but refused MR and did not continue medications or imaging outpatient  -neuro consulted, given that the symptoms seem to be chronic, only recommended aspirin 81 mg daily and atorvastatin 80 mg daily   - patient states she cannot tolerate an MRI and would prefer OP follow up for an open MRI   - counseled pt extensively on following up with neurology and completing the MRI   - continue prn meclizine as well for dizziness

## 2025-02-07 NOTE — CONSULT NOTE ADULT - ASSESSMENT
EKG - NSR   Echo - Normal LV function     a/p     1) Chest pains/SOB - pt has been having SOB for 1 year on exertion, EKG and 2d echo ok will f/u nuclear stress test     2) HLD - on lipitor    3) DVT prophylasix - sc lovenox

## 2025-02-11 ENCOUNTER — NON-APPOINTMENT (OUTPATIENT)
Age: 65
End: 2025-02-11

## 2025-02-11 ENCOUNTER — APPOINTMENT (OUTPATIENT)
Dept: PULMONOLOGY | Facility: CLINIC | Age: 65
End: 2025-02-11

## 2025-04-21 ENCOUNTER — APPOINTMENT (OUTPATIENT)
Dept: PULMONOLOGY | Facility: CLINIC | Age: 65
End: 2025-04-21